# Patient Record
Sex: FEMALE | Race: WHITE | NOT HISPANIC OR LATINO | Employment: OTHER | URBAN - METROPOLITAN AREA
[De-identification: names, ages, dates, MRNs, and addresses within clinical notes are randomized per-mention and may not be internally consistent; named-entity substitution may affect disease eponyms.]

---

## 2023-08-18 ENCOUNTER — HOSPITAL ENCOUNTER (OUTPATIENT)
Dept: DATA CONVERSION | Facility: HOSPITAL | Age: 78
Discharge: HOME | End: 2023-08-18
Payer: MEDICARE

## 2023-08-18 DIAGNOSIS — E03.9 HYPOTHYROIDISM, UNSPECIFIED: ICD-10-CM

## 2023-08-18 LAB
T4 FREE SERPL-MCNC: 1.5 NG/DL (ref 0.9–1.7)
TSH SERPL DL<=0.05 MIU/L-ACNC: 0.31 MIU/L (ref 0.27–4.2)

## 2023-09-25 PROBLEM — R20.2 PARESTHESIA: Status: ACTIVE | Noted: 2023-09-25

## 2023-09-25 PROBLEM — I67.9 CEREBROVASCULAR DISEASE: Status: ACTIVE | Noted: 2023-09-25

## 2023-09-25 PROBLEM — N95.0 POST-MENOPAUSAL BLEEDING: Status: ACTIVE | Noted: 2023-09-25

## 2023-09-25 PROBLEM — K21.9 GASTROESOPHAGEAL REFLUX DISEASE: Status: ACTIVE | Noted: 2023-09-25

## 2023-09-25 PROBLEM — E78.5 HYPERLIPIDEMIA: Status: ACTIVE | Noted: 2023-09-25

## 2023-09-25 PROBLEM — R91.1 PULMONARY NODULE: Status: ACTIVE | Noted: 2023-09-25

## 2023-09-25 PROBLEM — H91.90 HEARING LOSS: Status: ACTIVE | Noted: 2023-09-25

## 2023-09-25 PROBLEM — H93.13 BILATERAL TINNITUS: Status: ACTIVE | Noted: 2023-09-25

## 2023-09-25 PROBLEM — S43.421A SPRAIN OF RIGHT ROTATOR CUFF CAPSULE: Status: ACTIVE | Noted: 2023-09-25

## 2023-09-25 PROBLEM — Z98.890 S/P ROTATOR CUFF REPAIR: Status: ACTIVE | Noted: 2023-09-25

## 2023-09-25 PROBLEM — R73.03 PREDIABETES: Status: ACTIVE | Noted: 2023-09-25

## 2023-09-25 PROBLEM — R42 DIZZINESS: Status: ACTIVE | Noted: 2023-09-25

## 2023-09-25 PROBLEM — R06.02 SHORTNESS OF BREATH: Status: ACTIVE | Noted: 2023-09-25

## 2023-09-25 PROBLEM — E03.9 HYPOTHYROID: Status: ACTIVE | Noted: 2023-09-25

## 2023-09-25 PROBLEM — D32.0 BENIGN NEOPLASM OF CEREBRAL MENINGES (MULTI): Status: ACTIVE | Noted: 2023-09-25

## 2023-09-25 PROBLEM — H90.3 SENSORINEURAL HEARING LOSS (SNHL) OF BOTH EARS: Status: ACTIVE | Noted: 2023-09-25

## 2023-09-25 PROBLEM — E04.9 GOITER: Status: ACTIVE | Noted: 2023-09-25

## 2023-09-25 PROBLEM — R06.2 WHEEZING: Status: ACTIVE | Noted: 2023-09-25

## 2023-09-25 PROBLEM — N95.1 MENOPAUSAL SYNDROME: Status: ACTIVE | Noted: 2023-09-25

## 2023-09-25 PROBLEM — N95.9 MENOPAUSAL PROBLEM: Status: ACTIVE | Noted: 2023-09-25

## 2023-09-25 PROBLEM — R30.0 DYSURIA: Status: ACTIVE | Noted: 2023-09-25

## 2023-09-25 PROBLEM — E55.9 VITAMIN D DEFICIENCY: Status: ACTIVE | Noted: 2023-09-25

## 2023-09-25 PROBLEM — D72.829 LEUKOCYTOSIS: Status: ACTIVE | Noted: 2023-09-25

## 2023-09-25 PROBLEM — J45.909 REACTIVE AIRWAY DISEASE (HHS-HCC): Status: ACTIVE | Noted: 2023-09-25

## 2023-09-25 PROBLEM — I10 HYPERTENSION: Status: ACTIVE | Noted: 2023-09-25

## 2023-09-25 PROBLEM — M75.121 COMPLETE TEAR OF RIGHT ROTATOR CUFF: Status: ACTIVE | Noted: 2023-09-25

## 2023-09-25 RX ORDER — GABAPENTIN 100 MG/1
1-2 CAPSULE ORAL 3 TIMES DAILY PRN
COMMUNITY
Start: 2018-02-20 | End: 2023-10-03 | Stop reason: ALTCHOICE

## 2023-09-25 RX ORDER — LEVOTHYROXINE SODIUM 125 UG/1
125 TABLET ORAL
COMMUNITY
End: 2023-10-03 | Stop reason: SDUPTHER

## 2023-09-25 RX ORDER — NAPROXEN SODIUM 220 MG/1
1 TABLET, FILM COATED ORAL DAILY
COMMUNITY
Start: 2022-06-27 | End: 2023-10-03 | Stop reason: ALTCHOICE

## 2023-09-25 RX ORDER — VIT C/E/ZN/COPPR/LUTEIN/ZEAXAN 250MG-90MG
1 CAPSULE ORAL DAILY
COMMUNITY
Start: 2018-05-10 | End: 2023-10-03 | Stop reason: ALTCHOICE

## 2023-09-25 RX ORDER — OMEPRAZOLE 40 MG/1
40 CAPSULE, DELAYED RELEASE ORAL 2 TIMES DAILY PRN
COMMUNITY

## 2023-09-25 RX ORDER — HYDROXYZINE HYDROCHLORIDE 25 MG/1
1 TABLET, FILM COATED ORAL DAILY
COMMUNITY
Start: 2016-07-02

## 2023-09-25 RX ORDER — CLOTRIMAZOLE AND BETAMETHASONE DIPROPIONATE 10; .64 MG/G; MG/G
1 CREAM TOPICAL 2 TIMES DAILY
COMMUNITY
Start: 2023-04-20

## 2023-09-25 RX ORDER — UBIDECARENONE 75 MG
500 CAPSULE ORAL DAILY
COMMUNITY
Start: 2023-03-07

## 2023-09-25 RX ORDER — METFORMIN HYDROCHLORIDE EXTENDED-RELEASE TABLETS 500 MG/1
1 TABLET, FILM COATED, EXTENDED RELEASE ORAL
COMMUNITY
Start: 2023-03-07 | End: 2023-10-03

## 2023-09-25 RX ORDER — CHOLECALCIFEROL (VITAMIN D3) 125 MCG
3 CAPSULE ORAL DAILY
COMMUNITY
Start: 2021-01-14

## 2023-09-25 RX ORDER — EZETIMIBE 10 MG/1
1 TABLET ORAL DAILY
COMMUNITY
End: 2023-10-03

## 2023-09-25 RX ORDER — FLUTICASONE PROPIONATE 50 MCG
2 SPRAY, SUSPENSION (ML) NASAL DAILY
COMMUNITY

## 2023-10-03 ENCOUNTER — OFFICE VISIT (OUTPATIENT)
Dept: PRIMARY CARE | Facility: CLINIC | Age: 78
End: 2023-10-03
Payer: MEDICARE

## 2023-10-03 VITALS
BODY MASS INDEX: 36.71 KG/M2 | OXYGEN SATURATION: 95 % | DIASTOLIC BLOOD PRESSURE: 80 MMHG | HEART RATE: 98 BPM | WEIGHT: 187 LBS | HEIGHT: 60 IN | SYSTOLIC BLOOD PRESSURE: 126 MMHG

## 2023-10-03 DIAGNOSIS — E06.3 HYPOTHYROIDISM DUE TO HASHIMOTO'S THYROIDITIS: Primary | ICD-10-CM

## 2023-10-03 DIAGNOSIS — L98.9 SKIN LESION OF HAND: ICD-10-CM

## 2023-10-03 DIAGNOSIS — L30.9 DERMATITIS: ICD-10-CM

## 2023-10-03 DIAGNOSIS — E03.8 HYPOTHYROIDISM DUE TO HASHIMOTO'S THYROIDITIS: Primary | ICD-10-CM

## 2023-10-03 DIAGNOSIS — L57.0 AK (ACTINIC KERATOSIS): ICD-10-CM

## 2023-10-03 PROCEDURE — 17110 DESTRUCTION B9 LES UP TO 14: CPT

## 2023-10-03 PROCEDURE — 99213 OFFICE O/P EST LOW 20 MIN: CPT

## 2023-10-03 PROCEDURE — 3079F DIAST BP 80-89 MM HG: CPT

## 2023-10-03 PROCEDURE — 3074F SYST BP LT 130 MM HG: CPT

## 2023-10-03 PROCEDURE — 1159F MED LIST DOCD IN RCRD: CPT

## 2023-10-03 PROCEDURE — 1126F AMNT PAIN NOTED NONE PRSNT: CPT

## 2023-10-03 PROCEDURE — 1036F TOBACCO NON-USER: CPT

## 2023-10-03 RX ORDER — LEVOTHYROXINE SODIUM 125 UG/1
125 TABLET ORAL
Qty: 90 TABLET | Refills: 3 | Status: SHIPPED | OUTPATIENT
Start: 2023-10-03 | End: 2023-11-20

## 2023-10-03 ASSESSMENT — ENCOUNTER SYMPTOMS
PALPITATIONS: 0
WEIGHT GAIN: 0
WEIGHT LOSS: 0
FATIGUE: 0
TREMORS: 0
FEVER: 0
HAIR LOSS: 0
CHILLS: 0

## 2023-10-03 ASSESSMENT — PAIN SCALES - GENERAL: PAINLEVEL: 0-NO PAIN

## 2023-10-03 NOTE — PATIENT INSTRUCTIONS
-Patient is already est with [facility]. She was instructed to call and let them know she has a new growing skin lesion, educated needs bx. If unable to get in at a time,y matter patient is to call back and will fax referral for [facility], but prefers to try other first.   -Patient states her hydroxyzine is controlling the itch on her arms right now, but if worsens is to call.

## 2023-10-03 NOTE — PROGRESS NOTES
Subjective   Patient ID: Erica Smith is a 78 y.o. female who presents for Mass (Pt concerned for a growth on lt hand progressively getting bigger and sore /la ).    Thyroid Problem  Presents for follow-up visit. Patient reports no cold intolerance, fatigue, hair loss, heat intolerance, palpitations, tremors, weight gain or weight loss. The symptoms have been stable (On 125 mcg of Synthroid. Needs refills. Last TSH 9/18/2023 was normal.).      Left hand growth x 3 months. Noticed after blood .  Progressively enlarging and becoming sore. Also woke up itchy both arms and spot itchy. Patient endorses atopic dermatitis and was on hydroxyzine 25 mg and Gabapentin. Denies medication changes.     Acquired hypothyroidism: on . Last TSH on 8/18/2023 was normal. Needs reills.     Review of Systems   Constitutional:  Negative for chills, fatigue, fever, weight gain and weight loss.   Cardiovascular:  Negative for palpitations.   Endocrine: Negative for cold intolerance and heat intolerance.   Skin:  Positive for rash.   Neurological:  Negative for tremors.       Objective   /80   Pulse 98   Ht 1.524 m (5')   Wt 84.8 kg (187 lb)   SpO2 95%   BMI 36.52 kg/m²     Physical Exam  Constitutional:       General: She is not in acute distress.     Appearance: Normal appearance.   Neck:      Thyroid: No thyroid mass or thyromegaly.   Cardiovascular:      Rate and Rhythm: Normal rate and regular rhythm.      Heart sounds: No murmur heard.  Pulmonary:      Breath sounds: Normal breath sounds. No wheezing, rhonchi or rales.   Skin:     General: Skin is warm and dry.      Findings: Erythema, lesion and rash present.             Comments: 1.5 raised white plaque lesion with surrounding erythema on left dorsal hand. Erythematous nonhealing papule less than 0.5 cm on posterior right forearm. Finally generalized erythematous rash with mild scale on left forearm.    Neurological:      Mental Status: She is alert.    Psychiatric:         Mood and Affect: Mood normal.         Behavior: Behavior normal.         Assessment/Plan   Patient ID: Erica Smith is a 78 y.o. female.    Destruction of lesion    Date/Time: 10/3/2023 11:37 AM    Performed by: Angie Harris PA-C  Authorized by: Angie Harris PA-C    Number of Lesions: 1  Lesion 1:     Body area: upper extremity    Upper extremity location: R upper arm    Malignancy: benign lesion      Destruction method: cryotherapy    Diagnoses and all orders for this visit:  Hypothyroidism due to Hashimoto's thyroiditis  Dermatitis  AK (actinic keratosis)  Skin lesion of hand  Other orders  -     Destruction of lesion  -Patient is already est with [facility]. She was instructed to call and let them know she has a new growing skin lesion, educated needs bx. If unable to get in at a time,y matter patient is to call back and will fax referral for [facility], but prefers to try other first.   -Patient states her hydroxyzine is controlling the itch on her arms right now, but if worsens is to call.

## 2023-11-17 ENCOUNTER — OFFICE VISIT (OUTPATIENT)
Dept: PRIMARY CARE | Facility: CLINIC | Age: 78
End: 2023-11-17
Payer: MEDICARE

## 2023-11-17 VITALS
HEART RATE: 74 BPM | SYSTOLIC BLOOD PRESSURE: 124 MMHG | WEIGHT: 184 LBS | DIASTOLIC BLOOD PRESSURE: 80 MMHG | BODY MASS INDEX: 35.94 KG/M2 | OXYGEN SATURATION: 98 %

## 2023-11-17 DIAGNOSIS — M70.62 GREATER TROCHANTERIC BURSITIS OF BOTH HIPS: ICD-10-CM

## 2023-11-17 DIAGNOSIS — G56.01 RIGHT CARPAL TUNNEL SYNDROME: Primary | ICD-10-CM

## 2023-11-17 DIAGNOSIS — M76.31 IT BAND SYNDROME, RIGHT: ICD-10-CM

## 2023-11-17 DIAGNOSIS — M70.61 GREATER TROCHANTERIC BURSITIS OF BOTH HIPS: ICD-10-CM

## 2023-11-17 PROCEDURE — 1125F AMNT PAIN NOTED PAIN PRSNT: CPT

## 2023-11-17 PROCEDURE — 3074F SYST BP LT 130 MM HG: CPT

## 2023-11-17 PROCEDURE — 99213 OFFICE O/P EST LOW 20 MIN: CPT

## 2023-11-17 PROCEDURE — 1036F TOBACCO NON-USER: CPT

## 2023-11-17 PROCEDURE — 1159F MED LIST DOCD IN RCRD: CPT

## 2023-11-17 PROCEDURE — 3079F DIAST BP 80-89 MM HG: CPT

## 2023-11-17 RX ORDER — LEVOTHYROXINE SODIUM 125 UG/1
125 TABLET ORAL
COMMUNITY
End: 2023-11-17 | Stop reason: WASHOUT

## 2023-11-17 RX ORDER — PREDNISONE 10 MG/1
TABLET ORAL
Qty: 20 TABLET | Refills: 0 | Status: SHIPPED | OUTPATIENT
Start: 2023-11-17 | End: 2023-11-25

## 2023-11-17 RX ORDER — DUPILUMAB 300 MG/2ML
INJECTION, SOLUTION SUBCUTANEOUS
COMMUNITY

## 2023-11-17 RX ORDER — MECLIZINE HYDROCHLORIDE 25 MG/1
25 TABLET ORAL 3 TIMES DAILY PRN
COMMUNITY
Start: 2023-03-22

## 2023-11-17 ASSESSMENT — COLUMBIA-SUICIDE SEVERITY RATING SCALE - C-SSRS
1. IN THE PAST MONTH, HAVE YOU WISHED YOU WERE DEAD OR WISHED YOU COULD GO TO SLEEP AND NOT WAKE UP?: NO
6. HAVE YOU EVER DONE ANYTHING, STARTED TO DO ANYTHING, OR PREPARED TO DO ANYTHING TO END YOUR LIFE?: NO
2. HAVE YOU ACTUALLY HAD ANY THOUGHTS OF KILLING YOURSELF?: NO

## 2023-11-17 ASSESSMENT — ENCOUNTER SYMPTOMS
WEAKNESS: 1
BACK PAIN: 1
NECK STIFFNESS: 1
MYALGIAS: 1
NUMBNESS: 1

## 2023-11-17 ASSESSMENT — PATIENT HEALTH QUESTIONNAIRE - PHQ9
2. FEELING DOWN, DEPRESSED OR HOPELESS: NOT AT ALL
SUM OF ALL RESPONSES TO PHQ9 QUESTIONS 1 AND 2: 0
1. LITTLE INTEREST OR PLEASURE IN DOING THINGS: NOT AT ALL

## 2023-11-17 ASSESSMENT — PAIN SCALES - GENERAL: PAINLEVEL: 2

## 2023-11-17 NOTE — PROGRESS NOTES
"Subjective   Patient ID: Erica Smith is a 78 y.o. female who presents for Hand Pain (Pt is here to discuss the numbness in her right hand/ nr//Pt also wanted to discuss the burning pain, on her right thigh ).    Numbness in right hand x months. Associated hand weakness. Patient states on/off and is worsening. Started with right thumb and moved into the 2nd and 3rd digit now as well. Associated with weakness. Patient had carpal tunnel in left hand and had hand surgery 10/15/2018. States feels the same and would like referral to hand ortho. Previously went to [facility].     Right thigh pain x 2-3 weeks. No radiation of pain. Patient recently started part time job. Exacerbated when standing at work. Patient states pain is quick, and described as \"burn.\" When occurs if moves more/changes position pain goes away. Mild back pain, which is chronic and feels not associated with this. Also endorses more sensitive to touch.          Review of Systems   Musculoskeletal:  Positive for back pain, myalgias and neck stiffness.   Neurological:  Positive for weakness and numbness.       Objective   /80   Pulse 74   Wt 83.5 kg (184 lb)   SpO2 98%   BMI 35.94 kg/m²     Physical Exam  Constitutional:       Appearance: Normal appearance.   Cardiovascular:      Rate and Rhythm: Normal rate.   Pulmonary:      Effort: Pulmonary effort is normal.   Musculoskeletal:      Right wrist: No deformity or tenderness. Normal range of motion.      Left wrist: No deformity or tenderness. Normal range of motion.      Right hand: No deformity or tenderness. Normal strength.      Left hand: No deformity or tenderness. Normal strength.      Right hip: Tenderness present. Decreased range of motion.      Left hip: Tenderness present. Decreased range of motion.      Right upper leg: Tenderness present.      Comments: Unable to perform Phalen's test due to arm/shoulder mobility, negative right wrist Tinel Test.     TTP of lateral right " thigh and bilateral greater trochanter.    Neurological:      Mental Status: She is alert.   Psychiatric:         Mood and Affect: Mood and affect normal.         Assessment/Plan   Diagnoses and all orders for this visit:  Right carpal tunnel syndrome  -     Referral to Orthopaedic Surgery; Future  It band syndrome, right  -     Referral to Physical Therapy; Future  -     predniSONE (Deltasone) 10 mg tablet; Take 4 tablets (40 mg) by mouth once daily for 2 days, THEN 3 tablets (30 mg) once daily for 2 days, THEN 2 tablets (20 mg) once daily for 2 days, THEN 1 tablet (10 mg) once daily for 2 days.  Greater trochanteric bursitis of both hips  -     Referral to Physical Therapy; Future  -     predniSONE (Deltasone) 10 mg tablet; Take 4 tablets (40 mg) by mouth once daily for 2 days, THEN 3 tablets (30 mg) once daily for 2 days, THEN 2 tablets (20 mg) once daily for 2 days, THEN 1 tablet (10 mg) once daily for 2 days.  -Discuss possibility of bursa injections in future if needed.

## 2023-11-20 DIAGNOSIS — E06.3 HYPOTHYROIDISM DUE TO HASHIMOTO'S THYROIDITIS: ICD-10-CM

## 2023-11-20 DIAGNOSIS — E03.8 HYPOTHYROIDISM DUE TO HASHIMOTO'S THYROIDITIS: ICD-10-CM

## 2023-11-20 RX ORDER — LEVOTHYROXINE SODIUM 125 UG/1
TABLET ORAL
Qty: 90 TABLET | Refills: 1 | Status: SHIPPED | OUTPATIENT
Start: 2023-11-20 | End: 2024-05-02 | Stop reason: WASHOUT

## 2023-12-08 NOTE — PROGRESS NOTES
"Physical Therapy Evaluation and Treatment     Patient Name: Erica Smith  MRN: [id number]  PT Received On: 23  Time Calculation  Start Time: 1140  Stop Time: 1230  Time Calculation (min): 50 min  PT Evaluation Time Entry  PT Evaluation (Moderate) Time Entry: 40    Current Problem:   1. Chronic midline low back pain, unspecified whether sciatica present        2. It band syndrome, right  Referral to Physical Therapy      3. Greater trochanteric bursitis of both hips  Referral to Physical Therapy        Precautions:       Subjective Evaluation    History of Present Illness  Mechanism of injury: 77 yo female presents with chronic low back pain that has recently worsened. Pt was having RLE burning symptoms that has ceased after prednisone pack. Pt main complaint is significant low back pain with prolonged walking (>5 min). Relief with low back stretches. Vacuum bothers back. Notes very sedentary; wants to go to fitogram at [facility]. Pt describes feeling very stiff and states \"muscle that won't loosen up\". Hx of sciatica.    R RTC surgery; limited ROM     R hand numbness; going for EMG.     Recent picked up part-time at [employer]: standing does not bother her. Bothers neck looking down. 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     Pain  Current pain ratin  At best pain ratin  At worst pain rating: 10  Location: low back pain  Quality: tight  Relieving factors: change in position, rest, heat and medications  Progression: worsening    Social Support  Lives in: multiple-level home  Lives with: adult children and young children (Son, DIL, and two grandsons ())    Patient Goals  Patient goals for therapy: return to sport/leisure activities (loosen up)  Treatment goals comments: Pt wants to be able to go to zoo without having issues.      Objective     Postural Observations    Additional Postural Observation Details  Increased lumbar lordosis with forward head    Neurological Testing "     Sensation     Lumbar   Left   Intact: light touch    Right   Intact: light touch    Comments   Left light touch: min sensitive  Right light touch: min sensitive    Palpation   Left   Hypertonic in the erector spinae and lumbar paraspinals.   Tenderness of the erector spinae and lumbar paraspinals.     Right   Hypertonic in the erector spinae. Tenderness of the erector spinae and lumbar paraspinals.     Additional Palpation Details  TTP to B glute complex, piriformis and thoracolumbar fascia    Active Range of Motion     Lumbar   Flexion: Active lumbar flexion: 50% with pain  Extension: Active lumbar extension: 75% imp. with pain  Left lateral flexion: Active left lumbar lateral flexion: 25% imp. with pain  Right lateral flexion: Active right lumbar lateral flexion: 25% imp. with pain  Left rotation: with pain  Right rotation: with pain    Additional Active Range of Motion Details  Significantly limited in all lumbar ROM with c/o tightness    Strength/Myotome Testing     Left Hip   Planes of Motion   Flexion: 3  Extension: 3  Abduction: 3  Adduction: 3    Right Hip   Planes of Motion   Flexion: 3  Extension: 3  Abduction: 3  Adduction: 3    Left Knee   Flexion: 3+  Extension: 3+    Right Knee   Flexion: 3+  Extension: 3+    Tests       Thoracic   Negative slump.     Lumbar     Left   Negative passive SLR.     Right   Negative passive SLR.         Other Measures  Oswestry Disablity Index (LORRAINE): 15    Treatments:  Therapeutic Exercise:   Reviewed and completed HEP         Assessment & Plan     Assessment  Impairments: abnormal muscle tone, abnormal or restricted ROM, activity intolerance, impaired balance, impaired physical strength, lacks appropriate home exercise program and pain with function  Assessment details: 78 year old patient presenting with exacerbation of chronic low back pain. Pt displays core/LE weakness, impaired balance, ROM deficits, poor flexibility, impaired posture, pain, gait deficits, and  decreased functional mobility. Moderate complexity due to patient's clinical presentation being evolving with changing characteristics, with comorbidities to include hypothyroidism, sedentary lifestyle, chronicity of pain, hx of R RTC with limited mobility, and current workout of nerve issue for R hand, all of which may negatively impact rehab tolerance and progression. Pt to benefit from skilled PT to address above impairments.   Prognosis: good    Goals  Treatment goals comments: Pt wants to be able to go to zoo without having issues.    Plan  Therapy options: will be seen for skilled physical therapy services  Planned modality interventions: TENS, ultrasound, electrical stimulation/Russian stimulation, thermotherapy (hydrocollator packs), cryotherapy, low level laser therapy and traction  Other planned modality interventions: Dry Needling  Planned therapy interventions: abdominal trunk stabilization, balance/weight-bearing training, body mechanics training, flexibility, functional ROM exercises, gait training, home exercise program, joint mobilization, manual therapy, neuromuscular re-education, soft tissue mobilization, spinal/joint mobilization, strengthening, stretching, therapeutic activities and transfer training  Treatment plan discussed with: patient  Plan details: EVAL ONLY - [facility] - Auth thru [facility] - $35 copay until OOP is met. OOP $3800 - $380 met. // Specialty Services Required      Post-Treatment Pain:   No change    Goals:   - Patient will demonstrate independence and report compliance with HEP to facilitate independent rehab program upon discharge.  - Patient will report decrease in pain by > 2 points to meet MCID  - Pt will increase muscle strength by >/= 1/2 MMT to provide improved stability and ability to perform activities such as getting up off the ground after dusting/cutting dogs nails, resume full work duties without pain, able to perform ADLs/IADLs without pain.  - Pt will display  pain-free lumbar and hip AROM WFL for less difficulty squatting, transferring, and negotiating stairs.  - Patient will report ability to ambulate for >30 min without need to stop to allow her to complete grocery shopping and recreational activities.   - Patient will demo decrease of LORRAINE by 10% points to demo improved function and meet MCID    HEP:   Exercises: Access Code: [email or web address]  - Supine Posterior Pelvic Tilt  - 1 x daily - 7 x weekly - 2 sets - 10 reps  - Supine Lower Trunk Rotation  - 1 x daily - 7 x weekly - 2 sets - 10 reps  - Hooklying Single Knee to Chest Stretch  - 1 x daily - 7 x weekly - 1 sets - 10 reps - 15s hold  - Hooklying Active Hamstring Stretch  - 1 x daily - 7 x weekly - 2 sets - 10 reps - 5s hold  - Supine Double Knee to Chest  - 1 x daily - 7 x weekly - 1 sets - 10 reps - 15s hold  - Supine Figure 4 Piriformis Stretch  - 1 x daily - 7 x weekly - 1 sets - 5 reps - 15s hold  - Sidelying Open Book Thoracic Lumbar Rotation and Extension  - 1 x daily - 7 x weekly - 2 sets - 10 reps  - Hooklying Gluteal Sets  - 1 x daily - 7 x weekly - 2 sets - 10 reps - 5s hold

## 2023-12-11 ENCOUNTER — EVALUATION (OUTPATIENT)
Dept: PHYSICAL THERAPY | Facility: CLINIC | Age: 78
End: 2023-12-11
Payer: MEDICARE

## 2023-12-11 DIAGNOSIS — M70.61 GREATER TROCHANTERIC BURSITIS OF BOTH HIPS: ICD-10-CM

## 2023-12-11 DIAGNOSIS — M76.31 IT BAND SYNDROME, RIGHT: ICD-10-CM

## 2023-12-11 DIAGNOSIS — M70.62 GREATER TROCHANTERIC BURSITIS OF BOTH HIPS: ICD-10-CM

## 2023-12-11 DIAGNOSIS — M54.50 CHRONIC MIDLINE LOW BACK PAIN, UNSPECIFIED WHETHER SCIATICA PRESENT: Primary | ICD-10-CM

## 2023-12-11 DIAGNOSIS — G89.29 CHRONIC MIDLINE LOW BACK PAIN, UNSPECIFIED WHETHER SCIATICA PRESENT: Primary | ICD-10-CM

## 2023-12-11 PROCEDURE — 97162 PT EVAL MOD COMPLEX 30 MIN: CPT | Mod: GP

## 2023-12-11 ASSESSMENT — ENCOUNTER SYMPTOMS
PAIN SCALE AT LOWEST: 0
PAIN SCALE AT HIGHEST: 10
QUALITY: TIGHT
PAIN SCALE: 5
ALLEVIATING FACTORS: CHANGE IN POSITION
ALLEVIATING FACTORS: REST
PAIN LOCATION: LOW BACK PAIN
ALLEVIATING FACTORS: MEDICATIONS
ALLEVIATING FACTORS: HEAT

## 2023-12-11 ASSESSMENT — ACTIVITIES OF DAILY LIVING (ADL): POOR_BALANCE: 1

## 2024-01-10 ENCOUNTER — TREATMENT (OUTPATIENT)
Dept: PHYSICAL THERAPY | Facility: CLINIC | Age: 79
End: 2024-01-10
Payer: COMMERCIAL

## 2024-01-10 DIAGNOSIS — M70.61 GREATER TROCHANTERIC BURSITIS OF BOTH HIPS: ICD-10-CM

## 2024-01-10 DIAGNOSIS — M70.62 GREATER TROCHANTERIC BURSITIS OF BOTH HIPS: ICD-10-CM

## 2024-01-10 DIAGNOSIS — M54.50 CHRONIC MIDLINE LOW BACK PAIN, UNSPECIFIED WHETHER SCIATICA PRESENT: ICD-10-CM

## 2024-01-10 DIAGNOSIS — M76.31 IT BAND SYNDROME, RIGHT: ICD-10-CM

## 2024-01-10 DIAGNOSIS — G89.29 CHRONIC MIDLINE LOW BACK PAIN, UNSPECIFIED WHETHER SCIATICA PRESENT: ICD-10-CM

## 2024-01-10 PROCEDURE — 97110 THERAPEUTIC EXERCISES: CPT | Mod: GP

## 2024-01-10 ASSESSMENT — PAIN SCALES - GENERAL: PAINLEVEL_OUTOF10: 5 - MODERATE PAIN

## 2024-01-10 ASSESSMENT — PAIN - FUNCTIONAL ASSESSMENT: PAIN_FUNCTIONAL_ASSESSMENT: 0-10

## 2024-01-10 NOTE — PROGRESS NOTES
Physical Therapy Treatment    Patient Name: Erica Smith  MRN: [id number]  PT Received On: 01/10/24  Time Calculation  Start Time: 0130  Stop Time: 0215  Time Calculation (min): 45 min  PT Therapeutic Procedures Time Entry  Therapeutic Exercise Time Entry: 40  Visit Number: 1  Visits/Dates Authorized: 6    Current Problem:   1. It band syndrome, right  Follow Up In Physical Therapy      2. Greater trochanteric bursitis of both hips  Follow Up In Physical Therapy      3. Chronic midline low back pain, unspecified whether sciatica present  Follow Up In Physical Therapy        Precautions:        Subjective   General:   Pt has been completing HEP intermittently. No increase in pain but notes not significantly better. Pt has not been to therapy since evaluation due to expected insurance change come the new year and lack of available appointments to match her schedule.         Pre-Treatment Symptoms:   Pain Assessment: 0-10  Pain Score: 5 - Moderate pain    Objective   Findings:     Treatments:  Therapeutic Exercise:   Reviewed and completed HEP  Bridge 2x10   Hip add/abd 2x10       Neuromuscular Re-Ed:     Therapeutic Activity:    Gait Training:     Manual Therapy:     Modalities:         Assessment:   Pt tolerated tx without adverse response. Educated pt on purpose of exercises and reviewed how to complete correctly. Progress as able.      Post-Treatment Symptoms:   Response to Interventions: No worse    Plan:    Continue with lumbar mobility and strengthening    Goals:   Active       LBP       LTG's       Start:  12/11/23    Expected End:  03/10/24       ?- Patient will demonstrate independence and report compliance with HEP to facilitate independent rehab program upon discharge.  - Patient will report decrease in pain by > 2 points to meet MCID  - Pt will increase muscle strength by >/= 1/2 MMT to provide improved stability and ability to perform activities such as getting up off the ground after dusting/cutting  dogs nails, resume full work duties without pain, able to perform ADLs/IADLs without pain.  - Pt will display pain-free lumbar and hip AROM WFL for less difficulty squatting, transferring, and negotiating stairs.  - Patient will report ability to ambulate for >30 min without need to stop to allow her to complete grocery shopping and recreational activities.   - Patient will demo decrease of LORRAINE by 10% points to demo improved function and meet MCID           HEP:  Exercises - Access Code: [email or web address]  - Supine Posterior Pelvic Tilt  - 1 x daily - 7 x weekly - 2 sets - 10 reps  - Supine Lower Trunk Rotation  - 1 x daily - 7 x weekly - 2 sets - 10 reps  - Hooklying Single Knee to Chest Stretch  - 1 x daily - 7 x weekly - 1 sets - 10 reps - 15s hold  - Hooklying Active Hamstring Stretch  - 1 x daily - 7 x weekly - 2 sets - 10 reps - 5s hold  - Supine Double Knee to Chest  - 1 x daily - 7 x weekly - 1 sets - 10 reps - 15s hold  - Supine Figure 4 Piriformis Stretch  - 1 x daily - 7 x weekly - 1 sets - 5 reps - 15s hold  - Sidelying Open Book Thoracic Lumbar Rotation and Extension  - 1 x daily - 7 x weekly - 2 sets - 10 reps  - Hooklying Gluteal Sets  - 1 x daily - 7 x weekly - 2 sets - 10 reps - 5s hold

## 2024-03-06 ENCOUNTER — TRANSCRIBE ORDERS (OUTPATIENT)
Dept: ORTHOPEDIC SURGERY | Facility: HOSPITAL | Age: 79
End: 2024-03-06
Payer: COMMERCIAL

## 2024-03-06 DIAGNOSIS — M54.2 CERVICAL PAIN (NECK): ICD-10-CM

## 2024-03-08 ENCOUNTER — OFFICE VISIT (OUTPATIENT)
Dept: ORTHOPEDIC SURGERY | Facility: CLINIC | Age: 79
End: 2024-03-08
Payer: COMMERCIAL

## 2024-03-08 ENCOUNTER — HOSPITAL ENCOUNTER (OUTPATIENT)
Dept: RADIOLOGY | Facility: CLINIC | Age: 79
Discharge: HOME | End: 2024-03-08
Payer: COMMERCIAL

## 2024-03-08 VITALS — HEIGHT: 60 IN | WEIGHT: 186 LBS | BODY MASS INDEX: 36.52 KG/M2

## 2024-03-08 DIAGNOSIS — M54.12 CERVICAL RADICULOPATHY AT C5: Primary | ICD-10-CM

## 2024-03-08 DIAGNOSIS — M54.2 CERVICAL PAIN (NECK): ICD-10-CM

## 2024-03-08 PROCEDURE — 1159F MED LIST DOCD IN RCRD: CPT | Performed by: ORTHOPAEDIC SURGERY

## 2024-03-08 PROCEDURE — 72050 X-RAY EXAM NECK SPINE 4/5VWS: CPT

## 2024-03-08 PROCEDURE — 99203 OFFICE O/P NEW LOW 30 MIN: CPT | Performed by: ORTHOPAEDIC SURGERY

## 2024-03-08 PROCEDURE — 1125F AMNT PAIN NOTED PAIN PRSNT: CPT | Performed by: ORTHOPAEDIC SURGERY

## 2024-03-08 PROCEDURE — 72050 X-RAY EXAM NECK SPINE 4/5VWS: CPT | Performed by: RADIOLOGY

## 2024-03-08 PROCEDURE — 1036F TOBACCO NON-USER: CPT | Performed by: ORTHOPAEDIC SURGERY

## 2024-03-08 ASSESSMENT — PAIN - FUNCTIONAL ASSESSMENT: PAIN_FUNCTIONAL_ASSESSMENT: NO/DENIES PAIN

## 2024-03-08 NOTE — LETTER
March 11, 2024     Carmelo Jensen MD  [address] [address]  [place] [address]    Patient: Erica Smith   YOB: 1945   Date of Visit: 3/8/2024       Dear Dr. Carmelo Jensen MD:    Thank you for referring Erica Smith to me for evaluation. Below are my notes for this consultation.  If you have questions, please do not hesitate to call me. I look forward to following your patient along with you.       Sincerely,     Pete Cain MD      CC: No Recipients  ______________________________________________________________________________________    HPI:Erica Smith is a pleasant 79-year-old woman, who comes in today with complaints of some intermittent tingling in her right upper extremity.  It is largely in the shoulder and upper arm region.  She has had surgery on her right shoulder.  Patient states that she is unable to fully abduct that shoulder.  She has no pain in the arm.  She denies any myelopathic symptoms.  The numbness and tingling is completely tolerable at this point.      ROS:  Reviewed on EMR and patient intake sheet.    PMH/SH:   Reviewed on EMR and patient intake sheet.    Exam:  Physical Exam    Constitutional: Well appearing; no acute distress  Eyes: pupils are equal and round  Psych: normal affect  Respiratory: non-labored breathing  Cardiovascular: regular rate and rhythm  GI: non-distended abdomen  Musculoskeletal: Patient is able to abduct the right shoulder to approximately 90 degrees.  She is able to provide some resistance with mild pain.    Neurologic: [4+]/5 strength in the upper extremities bilaterally with the exception of 3/5 in right deltoid]; [negative] Yanez's; [no hyper-reflexia]    Radiology:  EMG is consistent with a chronic C5 radiculopathy    Diagnosis:  C5 radiculopathy    Assessment and Plan:   79-year-old woman with tingling in the right upper extremity and some limited range of motion.  EMG is consistent with a chronic C5 radiculopathy.  At this  time the patient states that her symptoms are quite tolerable and she has learned to accommodate them.  She is not interested in any further surgical intervention.  I believe her decreased range of motion is probably multifactorial including postsurgical limitation from her recent shoulder surgery as well as a concomitant C5 radiculopathy.  However, in the absence of pain and any progressive weakness, the patient is not interested in any further surgical intervention.  She will follow-up with me as needed should these stipulations change.    The patient was in agreement with the plan. At the end of the visit today, the patient felt that all questions had been answered satisfactorily.  The patient was pleased with the visit and very appreciative for the care rendered.     Thank you very much for the kind referral.  It is a privilege, and a pleasure, to partner with you in the care of your patients.  I would be delighted to assist you with any further consultations as needed.      Pete Cain MD    Chief of Spine Surgery, [facility]  Director of Spine Service, [facility]  , Department of Orthopaedics  [facility] [employer]  [address] [place], [place] [address] [id number]  P: [phone]  [facility]    This note was dictated with voice recognition software.  It has not been proofread for grammatical errors, typographical mistakes or other semantic inconsistencies.

## 2024-03-11 NOTE — PROGRESS NOTES
HPI:Erica Smith is a pleasant 79-year-old woman, who comes in today with complaints of some intermittent tingling in her right upper extremity.  It is largely in the shoulder and upper arm region.  She has had surgery on her right shoulder.  Patient states that she is unable to fully abduct that shoulder.  She has no pain in the arm.  She denies any myelopathic symptoms.  The numbness and tingling is completely tolerable at this point.      ROS:  Reviewed on EMR and patient intake sheet.    PMH/SH:   Reviewed on EMR and patient intake sheet.    Exam:  Physical Exam    Constitutional: Well appearing; no acute distress  Eyes: pupils are equal and round  Psych: normal affect  Respiratory: non-labored breathing  Cardiovascular: regular rate and rhythm  GI: non-distended abdomen  Musculoskeletal: Patient is able to abduct the right shoulder to approximately 90 degrees.  She is able to provide some resistance with mild pain.    Neurologic: [4+]/5 strength in the upper extremities bilaterally with the exception of 3/5 in right deltoid]; [negative] Yanez's; [no hyper-reflexia]    Radiology:  EMG is consistent with a chronic C5 radiculopathy    Diagnosis:  C5 radiculopathy    Assessment and Plan:   79-year-old woman with tingling in the right upper extremity and some limited range of motion.  EMG is consistent with a chronic C5 radiculopathy.  At this time the patient states that her symptoms are quite tolerable and she has learned to accommodate them.  She is not interested in any further surgical intervention.  I believe her decreased range of motion is probably multifactorial including postsurgical limitation from her recent shoulder surgery as well as a concomitant C5 radiculopathy.  However, in the absence of pain and any progressive weakness, the patient is not interested in any further surgical intervention.  She will follow-up with me as needed should these stipulations change.    The patient was in agreement  with the plan. At the end of the visit today, the patient felt that all questions had been answered satisfactorily.  The patient was pleased with the visit and very appreciative for the care rendered.     Thank you very much for the kind referral.  It is a privilege, and a pleasure, to partner with you in the care of your patients.  I would be delighted to assist you with any further consultations as needed.      Pete Cain MD    Chief of Spine Surgery, [facility]  Director of Spine Service, [facility]  , Department of Orthopaedics  [facility]  [address] [id number]  P: [phone]  [email or web address]    This note was dictated with voice recognition software.  It has not been proofread for grammatical errors, typographical mistakes or other semantic inconsistencies.

## 2024-03-15 ENCOUNTER — LAB REQUISITION (OUTPATIENT)
Dept: LAB | Facility: HOSPITAL | Age: 79
End: 2024-03-15
Payer: COMMERCIAL

## 2024-03-15 DIAGNOSIS — N39.0 URINARY TRACT INFECTION, SITE NOT SPECIFIED: ICD-10-CM

## 2024-03-15 PROCEDURE — 87186 SC STD MICRODIL/AGAR DIL: CPT

## 2024-03-15 PROCEDURE — 87086 URINE CULTURE/COLONY COUNT: CPT

## 2024-03-19 LAB — BACTERIA UR CULT: ABNORMAL

## 2024-04-03 ENCOUNTER — DOCUMENTATION (OUTPATIENT)
Dept: PHYSICAL THERAPY | Facility: CLINIC | Age: 79
End: 2024-04-03
Payer: COMMERCIAL

## 2024-04-03 NOTE — PROGRESS NOTES
Physical Therapy    Discharge Summary    Name: Erica Smith  MRN: [id number]  : 1945  Date: 24    Discharge Summary: PT    Discharge Information: Date of discharge 4/3/24, Date of last visit 1/10/24, Date of evaluation 23, and Number of attended visits 2    Therapy Summary: Pt attended initial evaluation. Pt returned for one visit having been completing HEP intermittently. No increase in pain but notes not significantly better. Pt has not been to therapy since evaluation due to expected insurance change come the new year and lack of available appointments to match her schedule.  Pt did not return. Authorization ended.     Discharge Status: Resolved     Rehab Discharge Reason: Failed to schedule and/or keep follow-up appointment(s)

## 2024-05-01 ENCOUNTER — OFFICE VISIT (OUTPATIENT)
Dept: PRIMARY CARE | Facility: CLINIC | Age: 79
End: 2024-05-01
Payer: COMMERCIAL

## 2024-05-01 VITALS
BODY MASS INDEX: 36.52 KG/M2 | HEIGHT: 60 IN | DIASTOLIC BLOOD PRESSURE: 80 MMHG | SYSTOLIC BLOOD PRESSURE: 132 MMHG | WEIGHT: 186 LBS | OXYGEN SATURATION: 98 % | HEART RATE: 73 BPM

## 2024-05-01 DIAGNOSIS — R53.83 FATIGUE, UNSPECIFIED TYPE: ICD-10-CM

## 2024-05-01 DIAGNOSIS — D32.0 BENIGN NEOPLASM OF CEREBRAL MENINGES (MULTI): ICD-10-CM

## 2024-05-01 DIAGNOSIS — E03.9 ACQUIRED HYPOTHYROIDISM: ICD-10-CM

## 2024-05-01 DIAGNOSIS — Z00.00 MEDICARE ANNUAL WELLNESS VISIT, SUBSEQUENT: Primary | ICD-10-CM

## 2024-05-01 DIAGNOSIS — R35.1 NOCTURIA: ICD-10-CM

## 2024-05-01 DIAGNOSIS — Z12.11 COLON CANCER SCREENING: ICD-10-CM

## 2024-05-01 DIAGNOSIS — N18.31 STAGE 3A CHRONIC KIDNEY DISEASE (MULTI): ICD-10-CM

## 2024-05-01 DIAGNOSIS — E66.01 OBESITY, MORBID (MULTI): ICD-10-CM

## 2024-05-01 DIAGNOSIS — Z12.11 ENCOUNTER FOR SCREENING FOR MALIGNANT NEOPLASM OF COLON: ICD-10-CM

## 2024-05-01 DIAGNOSIS — Z12.31 SCREENING MAMMOGRAM FOR BREAST CANCER: ICD-10-CM

## 2024-05-01 DIAGNOSIS — E55.9 VITAMIN D DEFICIENCY: ICD-10-CM

## 2024-05-01 DIAGNOSIS — Z23 ENCOUNTER FOR IMMUNIZATION: ICD-10-CM

## 2024-05-01 DIAGNOSIS — E78.2 MIXED HYPERLIPIDEMIA: ICD-10-CM

## 2024-05-01 PROCEDURE — 1126F AMNT PAIN NOTED NONE PRSNT: CPT | Performed by: FAMILY MEDICINE

## 2024-05-01 PROCEDURE — 99214 OFFICE O/P EST MOD 30 MIN: CPT | Performed by: FAMILY MEDICINE

## 2024-05-01 PROCEDURE — G0439 PPPS, SUBSEQ VISIT: HCPCS | Performed by: FAMILY MEDICINE

## 2024-05-01 PROCEDURE — 1158F ADVNC CARE PLAN TLK DOCD: CPT | Performed by: FAMILY MEDICINE

## 2024-05-01 PROCEDURE — 3075F SYST BP GE 130 - 139MM HG: CPT | Performed by: FAMILY MEDICINE

## 2024-05-01 PROCEDURE — 1159F MED LIST DOCD IN RCRD: CPT | Performed by: FAMILY MEDICINE

## 2024-05-01 PROCEDURE — 1160F RVW MEDS BY RX/DR IN RCRD: CPT | Performed by: FAMILY MEDICINE

## 2024-05-01 PROCEDURE — 1170F FXNL STATUS ASSESSED: CPT | Performed by: FAMILY MEDICINE

## 2024-05-01 PROCEDURE — 99215 OFFICE O/P EST HI 40 MIN: CPT | Performed by: FAMILY MEDICINE

## 2024-05-01 PROCEDURE — 1036F TOBACCO NON-USER: CPT | Performed by: FAMILY MEDICINE

## 2024-05-01 PROCEDURE — 1123F ACP DISCUSS/DSCN MKR DOCD: CPT | Performed by: FAMILY MEDICINE

## 2024-05-01 PROCEDURE — 3079F DIAST BP 80-89 MM HG: CPT | Performed by: FAMILY MEDICINE

## 2024-05-01 PROCEDURE — 90677 PCV20 VACCINE IM: CPT | Performed by: FAMILY MEDICINE

## 2024-05-01 RX ORDER — LATANOPROST 50 UG/ML
1 SOLUTION/ DROPS OPHTHALMIC NIGHTLY
COMMUNITY
Start: 2024-04-16

## 2024-05-01 ASSESSMENT — ENCOUNTER SYMPTOMS
TREMORS: 0
COUGH: 0
NAUSEA: 0
HEMATURIA: 0
OCCASIONAL FEELINGS OF UNSTEADINESS: 0
CONFUSION: 0
NERVOUS/ANXIOUS: 0
FEVER: 0
DIZZINESS: 0
SHORTNESS OF BREATH: 0
MYALGIAS: 1
WEAKNESS: 0
DEPRESSION: 0
FREQUENCY: 0
ARTHRALGIAS: 1
CONSTIPATION: 0
FATIGUE: 1
LOSS OF SENSATION IN FEET: 0
WHEEZING: 0
PALPITATIONS: 0
VOMITING: 0
CHILLS: 0

## 2024-05-01 ASSESSMENT — GERIATRIC MINI NUTRITIONAL ASSESSMENT (MNA)
C GENERAL MOBILITY: GOES OUT
B WEIGHT LOSS DURING THE LAST 3 MONTHS: DOES NOT KNOW
A HAS FOOD INTAKE DECLINED OVER THE PAST 3 MONTHS DUE TO LOSS OF APPETITE, DIGESTIVE PROBLEMS, CHEWING OR SWALLOWING DIFFICULTIES?: NO DECREASE IN FOOD INTAKE

## 2024-05-01 ASSESSMENT — ACTIVITIES OF DAILY LIVING (ADL)
FEEDING: INDEPENDENT
PILL BOX USED: YES
USING TELEPHONE: INDEPENDENT
EATING: INDEPENDENT
PREPARING MEALS: INDEPENDENT
DRESSING: INDEPENDENT
USING TRANSPORTATION: INDEPENDENT
TOILETING: INDEPENDENT
NEEDS ASSISTANCE WITH FOOD: INDEPENDENT
ADEQUATE_TO_COMPLETE_ADL: YES
MANAGING FINANCES: INDEPENDENT
BATHING: INDEPENDENT
TAKING MEDICATION: INDEPENDENT
JUDGMENT_ADEQUATE_SAFELY_COMPLETE_DAILY_ACTIVITIES: YES
DOING HOUSEWORK: INDEPENDENT
GROCERY SHOPPING: INDEPENDENT
STIL DRIVING: YES

## 2024-05-01 ASSESSMENT — PAIN SCALES - GENERAL: PAINLEVEL: 0-NO PAIN

## 2024-05-01 ASSESSMENT — COGNITIVE AND FUNCTIONAL STATUS - GENERAL: VERBAL FLUENCY - ANIMAL NAMES (0 TO 25): 3

## 2024-05-01 NOTE — PROGRESS NOTES
Subjective   Patient ID: Erica Smith is a 79 y.o. female who presents for Annual Exam.    Mini Cognitive Screening:          Three Word Recall             Words:  Book, apple, window.            Patient able to repeat?  Yes .         Three Word Recall after 5mins             Word recall Score (0-3):  3 .         Clock Drawing             Given preprinted [place] and instructions?  Yes .            Clock Draw Score (0 or 2):  2 .         Clock Draw plus Word Recall Score             Mini Cog Result <3 Pos:  5 .         Follow-up:             .  Not needed, negative result .     General Comments:          Patient here for Medicare Wellness Exam.         Active medical problems:         PMH/PSH/FMH/SHX: reviewed, noted below.         Other providers:         Medications: reviewed, noted below.         Depression screening: denies feeling down, depressed, hopeless. Denies having felt little interest or pleasure in doing things.         Functional ability and safety: Get up and go test is <30s. Pt denies any issues with ADLs, including phone, transportation, shopping, preparing meals, housework,laundry, medications or managing money. No home safety concerns, including having rugs in the hallway, lack grab bars in the bathroom, lack handrails, on the stairs or have poor lighting. Pt denies falls.         Cognitive evaluation: MMSE         Hearing changes: Pt denies changes or concerns.         Advanced directives: discussed and recommended.          Preventative services: sheet reviewed, scanned, copy provided to patient.     Hyperlipidemia:          Patient here for F/U. currently trying to control with therapeutic lifestyle changes, she is unable to take statins, she was found to have non specific changes on her MRI - no CVA noted, but she does have microvascular disease.          Labs ordered today.          she does not tolerate statins - has tried ezetimide.     Hypertension:          Patient here for F/U. at  goal.          Med compliance yes.          Med side effects none.      Gastroenterology:          she has a small hiatal hernia in on her CT chest - she states it is stable, no symptoms.     -Pulmonology:          she has a smoking history with stable pulmonary nodules.     Hypothyroidism:          Follow Up her thyroid was overtreated at 137 mcg a day. When she dropped down to 100 she felt muscle cramps and fatigue, she is now on 125 mcg and will check her labs in a couple of weeks - but with side effects.          Hypothyroidism tolerating meds with no side effects- feels tired on lower doses.      General Endocrinology:          she has mildly elevated sugars - she has not been taking her metformin - she has been trying to follow a healthy diet, she has good and bad days with her diet     Medications reviewed:          Current medications Use reviewed and recorded.          Vitamin/Mineral supplements Use reviewed and recorded.   Her colonoscopy is due soon - referral to Dr Nicholas, mammogram ordered - labs ordered, prevnar 20 given     Review family history:          Reviewed See family history.      Review past history:          Reviewed See past history.      Review surgical history:          Reviewed See surgical history.          Review of Systems   Constitutional:  Positive for fatigue. Negative for chills and fever.   HENT:  Negative for ear pain, postnasal drip and tinnitus.    Respiratory:  Negative for cough, shortness of breath and wheezing.    Cardiovascular:  Negative for chest pain, palpitations and leg swelling.   Gastrointestinal:  Negative for constipation, nausea and vomiting.   Genitourinary:  Negative for frequency and hematuria.   Musculoskeletal:  Positive for arthralgias and myalgias.   Neurological:  Negative for dizziness, tremors and weakness.   Psychiatric/Behavioral:  Negative for confusion. The patient is not nervous/anxious.    It D def, nocturia    Objective   /80   Pulse 73    Ht 1.524 m (5')   Wt 84.4 kg (186 lb)   SpO2 98%   BMI 36.33 kg/m²     Physical Exam  Vitals reviewed.   Constitutional:       General: She is not in acute distress.     Appearance: Normal appearance. She is not ill-appearing.   HENT:      Right Ear: Tympanic membrane normal.      Left Ear: Tympanic membrane normal.      Nose: No congestion or rhinorrhea.      Mouth/Throat:      Pharynx: No posterior oropharyngeal erythema.   Eyes:      General:         Right eye: No discharge.         Left eye: No discharge.   Cardiovascular:      Rate and Rhythm: Normal rate and regular rhythm.      Heart sounds: Normal heart sounds. No murmur heard.  Pulmonary:      Breath sounds: Normal breath sounds.   Chest:   Breasts:     Right: Normal. No mass, nipple discharge, skin change or tenderness.      Left: Normal. No mass, nipple discharge, skin change or tenderness.   Abdominal:      General: Abdomen is flat. Bowel sounds are normal.      Palpations: Abdomen is soft.   Genitourinary:     Comments: Patient declines  Musculoskeletal:      Cervical back: Neck supple.      Right lower leg: No edema.      Left lower leg: No edema.   Skin:     Findings: No rash.   Neurological:      General: No focal deficit present.      Mental Status: She is alert and oriented to person, place, and time.   Psychiatric:         Mood and Affect: Mood normal.         Behavior: Behavior normal.         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    Hypothyroid E03.9    Relevant Orders    TSH with reflex to Free T4 if abnormal    Thyroxine, Free    Hyperlipidemia E78.5    Relevant Orders    Comprehensive Metabolic Panel    Lipid Panel    Vitamin D deficiency E55.9    Relevant Orders    Vitamin D 25-Hydroxy,Total (for eval of Vitamin D levels)    Obesity, morbid (Multi) E66.01    Stage 3a chronic kidney disease (Multi) N18.31     Other Visit Diagnoses          Codes    Medicare annual wellness visit, subsequent    -  Primary Z00.00    Fatigue, unspecified type     R53.83    Relevant Orders    CBC and Auto Differential    Nocturia     R35.1    Relevant Orders    Urinalysis with Reflex Culture and Microscopic    Colon cancer screening     Z12.11    Relevant Orders    Referral to Gastroenterology    Screening mammogram for breast cancer     Z12.31    Relevant Orders    BI mammo bilateral screening tomosynthesis    Encounter for immunization     Z23    Encounter for screening for malignant neoplasm of colon     Z12.11    referral to Dr Nicholas for a colonoscopy

## 2024-05-02 ENCOUNTER — LAB (OUTPATIENT)
Dept: LAB | Facility: LAB | Age: 79
End: 2024-05-02
Payer: COMMERCIAL

## 2024-05-02 DIAGNOSIS — E03.9 ACQUIRED HYPOTHYROIDISM: Primary | ICD-10-CM

## 2024-05-02 DIAGNOSIS — E78.2 MIXED HYPERLIPIDEMIA: ICD-10-CM

## 2024-05-02 DIAGNOSIS — E55.9 VITAMIN D DEFICIENCY: ICD-10-CM

## 2024-05-02 DIAGNOSIS — R53.83 FATIGUE, UNSPECIFIED TYPE: ICD-10-CM

## 2024-05-02 DIAGNOSIS — R35.1 NOCTURIA: ICD-10-CM

## 2024-05-02 DIAGNOSIS — E03.9 ACQUIRED HYPOTHYROIDISM: ICD-10-CM

## 2024-05-02 LAB
25(OH)D3 SERPL-MCNC: 36 NG/ML (ref 31–100)
ALBUMIN SERPL-MCNC: 4.3 G/DL (ref 3.5–5)
ALP BLD-CCNC: 93 U/L (ref 35–125)
ALT SERPL-CCNC: 10 U/L (ref 5–40)
ANION GAP SERPL CALC-SCNC: 18 MMOL/L
APPEARANCE UR: CLEAR
AST SERPL-CCNC: 13 U/L (ref 5–40)
BASOPHILS # BLD AUTO: 0.1 X10*3/UL (ref 0–0.1)
BASOPHILS NFR BLD AUTO: 1 %
BILIRUB SERPL-MCNC: 0.7 MG/DL (ref 0.1–1.2)
BILIRUB UR STRIP.AUTO-MCNC: NEGATIVE MG/DL
BUN SERPL-MCNC: 19 MG/DL (ref 8–25)
CALCIUM SERPL-MCNC: 10 MG/DL (ref 8.5–10.4)
CHLORIDE SERPL-SCNC: 98 MMOL/L (ref 97–107)
CHOLEST SERPL-MCNC: 220 MG/DL (ref 133–200)
CHOLEST/HDLC SERPL: 3.8 {RATIO}
CO2 SERPL-SCNC: 23 MMOL/L (ref 24–31)
COLOR UR: YELLOW
CREAT SERPL-MCNC: 0.9 MG/DL (ref 0.4–1.6)
EGFRCR SERPLBLD CKD-EPI 2021: 66 ML/MIN/1.73M*2
EOSINOPHIL # BLD AUTO: 0.11 X10*3/UL (ref 0–0.4)
EOSINOPHIL NFR BLD AUTO: 1.1 %
ERYTHROCYTE [DISTWIDTH] IN BLOOD BY AUTOMATED COUNT: 12.5 % (ref 11.5–14.5)
GLUCOSE SERPL-MCNC: 119 MG/DL (ref 65–99)
GLUCOSE UR STRIP.AUTO-MCNC: NORMAL MG/DL
HCT VFR BLD AUTO: 44.2 % (ref 36–46)
HDLC SERPL-MCNC: 58 MG/DL
HGB BLD-MCNC: 14.4 G/DL (ref 12–16)
HOLD SPECIMEN: NORMAL
HYALINE CASTS #/AREA URNS AUTO: ABNORMAL /LPF
IMM GRANULOCYTES # BLD AUTO: 0.04 X10*3/UL (ref 0–0.5)
IMM GRANULOCYTES NFR BLD AUTO: 0.4 % (ref 0–0.9)
KETONES UR STRIP.AUTO-MCNC: NEGATIVE MG/DL
LDLC SERPL CALC-MCNC: 139 MG/DL (ref 65–130)
LEUKOCYTE ESTERASE UR QL STRIP.AUTO: ABNORMAL
LYMPHOCYTES # BLD AUTO: 1.79 X10*3/UL (ref 0.8–3)
LYMPHOCYTES NFR BLD AUTO: 17.4 %
MCH RBC QN AUTO: 30.1 PG (ref 26–34)
MCHC RBC AUTO-ENTMCNC: 32.6 G/DL (ref 32–36)
MCV RBC AUTO: 92 FL (ref 80–100)
MONOCYTES # BLD AUTO: 0.75 X10*3/UL (ref 0.05–0.8)
MONOCYTES NFR BLD AUTO: 7.3 %
MUCOUS THREADS #/AREA URNS AUTO: ABNORMAL /LPF
NEUTROPHILS # BLD AUTO: 7.52 X10*3/UL (ref 1.6–5.5)
NEUTROPHILS NFR BLD AUTO: 72.8 %
NITRITE UR QL STRIP.AUTO: NEGATIVE
NRBC BLD-RTO: 0 /100 WBCS (ref 0–0)
PH UR STRIP.AUTO: 5.5 [PH]
PLATELET # BLD AUTO: 330 X10*3/UL (ref 150–450)
POTASSIUM SERPL-SCNC: 4.9 MMOL/L (ref 3.4–5.1)
PROT SERPL-MCNC: 7.1 G/DL (ref 5.9–7.9)
PROT UR STRIP.AUTO-MCNC: NEGATIVE MG/DL
RBC # BLD AUTO: 4.79 X10*6/UL (ref 4–5.2)
RBC # UR STRIP.AUTO: ABNORMAL /UL
RBC #/AREA URNS AUTO: ABNORMAL /HPF
SODIUM SERPL-SCNC: 139 MMOL/L (ref 133–145)
SP GR UR STRIP.AUTO: 1.02
SQUAMOUS #/AREA URNS AUTO: ABNORMAL /HPF
T4 FREE SERPL-MCNC: 1.8 NG/DL (ref 0.9–1.7)
TRIGL SERPL-MCNC: 117 MG/DL (ref 40–150)
TSH SERPL DL<=0.05 MIU/L-ACNC: 0.07 MIU/L (ref 0.27–4.2)
UROBILINOGEN UR STRIP.AUTO-MCNC: NORMAL MG/DL
WBC # BLD AUTO: 10.3 X10*3/UL (ref 4.4–11.3)
WBC #/AREA URNS AUTO: ABNORMAL /HPF

## 2024-05-02 PROCEDURE — 82306 VITAMIN D 25 HYDROXY: CPT

## 2024-05-02 PROCEDURE — 87086 URINE CULTURE/COLONY COUNT: CPT

## 2024-05-02 PROCEDURE — 80053 COMPREHEN METABOLIC PANEL: CPT

## 2024-05-02 PROCEDURE — 84439 ASSAY OF FREE THYROXINE: CPT

## 2024-05-02 PROCEDURE — 81001 URINALYSIS AUTO W/SCOPE: CPT

## 2024-05-02 PROCEDURE — 36415 COLL VENOUS BLD VENIPUNCTURE: CPT

## 2024-05-02 PROCEDURE — 85025 COMPLETE CBC W/AUTO DIFF WBC: CPT

## 2024-05-02 PROCEDURE — 80061 LIPID PANEL: CPT

## 2024-05-02 PROCEDURE — 84443 ASSAY THYROID STIM HORMONE: CPT

## 2024-05-02 RX ORDER — LEVOTHYROXINE SODIUM 100 UG/1
100 TABLET ORAL DAILY
Qty: 90 TABLET | Refills: 0 | Status: SHIPPED | OUTPATIENT
Start: 2024-05-02 | End: 2024-05-06 | Stop reason: SINTOL

## 2024-05-03 LAB — BACTERIA UR CULT: NORMAL

## 2024-05-06 ENCOUNTER — TELEPHONE (OUTPATIENT)
Dept: PRIMARY CARE | Facility: CLINIC | Age: 79
End: 2024-05-06
Payer: COMMERCIAL

## 2024-05-06 DIAGNOSIS — E03.9 HYPOTHYROIDISM, UNSPECIFIED TYPE: ICD-10-CM

## 2024-05-06 RX ORDER — LEVOTHYROXINE SODIUM 100 UG/1
100 TABLET ORAL DAILY
Qty: 90 TABLET | Refills: 0 | Status: SHIPPED | OUTPATIENT
Start: 2024-05-06 | End: 2024-08-04

## 2024-05-06 NOTE — TELEPHONE ENCOUNTER
Patient notified of recent lab results. Please re send patient's thyroid medication. This needs to be Synthroid (CARLOS A). She agrees to try Zetia. Please send to pharmacy. Future thyroid lab orders placed. [facility] in [place].

## 2024-05-17 ENCOUNTER — APPOINTMENT (OUTPATIENT)
Dept: RADIOLOGY | Facility: HOSPITAL | Age: 79
End: 2024-05-17
Payer: COMMERCIAL

## 2024-05-21 ENCOUNTER — HOSPITAL ENCOUNTER (OUTPATIENT)
Dept: RADIOLOGY | Facility: HOSPITAL | Age: 79
Discharge: HOME | End: 2024-05-21
Payer: COMMERCIAL

## 2024-05-21 VITALS — HEIGHT: 60 IN | BODY MASS INDEX: 36.52 KG/M2 | WEIGHT: 186 LBS

## 2024-05-21 DIAGNOSIS — Z12.31 SCREENING MAMMOGRAM FOR BREAST CANCER: ICD-10-CM

## 2024-05-21 PROCEDURE — 77063 BREAST TOMOSYNTHESIS BI: CPT | Performed by: RADIOLOGY

## 2024-05-21 PROCEDURE — 77067 SCR MAMMO BI INCL CAD: CPT

## 2024-05-21 PROCEDURE — 77067 SCR MAMMO BI INCL CAD: CPT | Performed by: RADIOLOGY

## 2024-05-26 DIAGNOSIS — Z12.31 SCREENING MAMMOGRAM FOR BREAST CANCER: Primary | ICD-10-CM

## 2024-06-24 ENCOUNTER — LAB (OUTPATIENT)
Dept: LAB | Facility: LAB | Age: 79
End: 2024-06-24
Payer: COMMERCIAL

## 2024-06-24 DIAGNOSIS — E03.9 HYPOTHYROIDISM, UNSPECIFIED TYPE: ICD-10-CM

## 2024-06-24 DIAGNOSIS — E03.9 ACQUIRED HYPOTHYROIDISM: ICD-10-CM

## 2024-06-24 DIAGNOSIS — R73.03 PREDIABETES: ICD-10-CM

## 2024-06-24 LAB
T4 FREE SERPL-MCNC: 1.8 NG/DL (ref 0.9–1.7)
TSH SERPL DL<=0.05 MIU/L-ACNC: 0.34 MIU/L (ref 0.27–4.2)

## 2024-06-24 PROCEDURE — 80048 BASIC METABOLIC PNL TOTAL CA: CPT

## 2024-06-24 PROCEDURE — 84439 ASSAY OF FREE THYROXINE: CPT

## 2024-06-24 PROCEDURE — 36415 COLL VENOUS BLD VENIPUNCTURE: CPT

## 2024-06-24 PROCEDURE — 84443 ASSAY THYROID STIM HORMONE: CPT

## 2024-06-25 ENCOUNTER — TELEPHONE (OUTPATIENT)
Dept: PRIMARY CARE | Facility: CLINIC | Age: 79
End: 2024-06-25
Payer: COMMERCIAL

## 2024-06-25 DIAGNOSIS — E03.9 ACQUIRED HYPOTHYROIDISM: ICD-10-CM

## 2024-06-25 DIAGNOSIS — E03.9 HYPOTHYROIDISM, UNSPECIFIED TYPE: ICD-10-CM

## 2024-06-25 DIAGNOSIS — R73.03 PREDIABETES: Primary | ICD-10-CM

## 2024-06-25 LAB
ANION GAP SERPL CALC-SCNC: 19 MMOL/L
BUN SERPL-MCNC: 11 MG/DL (ref 8–25)
CALCIUM SERPL-MCNC: 9.4 MG/DL (ref 8.5–10.4)
CHLORIDE SERPL-SCNC: 102 MMOL/L (ref 97–107)
CO2 SERPL-SCNC: 20 MMOL/L (ref 24–31)
CREAT SERPL-MCNC: 0.8 MG/DL (ref 0.4–1.6)
EGFRCR SERPLBLD CKD-EPI 2021: 76 ML/MIN/1.73M*2
GLUCOSE SERPL-MCNC: 135 MG/DL (ref 65–99)
POTASSIUM SERPL-SCNC: 4.3 MMOL/L (ref 3.4–5.1)
SODIUM SERPL-SCNC: 141 MMOL/L (ref 133–145)

## 2024-06-25 RX ORDER — LEVOTHYROXINE SODIUM 100 UG/1
TABLET ORAL
Qty: 80 TABLET | Refills: 1 | Status: SHIPPED | OUTPATIENT
Start: 2024-06-25

## 2024-06-25 NOTE — TELEPHONE ENCOUNTER
Patient notified of recent lab results. She agrees to decrease her Levothyroxine dose. She will need a new RX to [facility] in [place]. Future lab orders placed to re check in 6 weeks.

## 2024-06-25 NOTE — PROGRESS NOTES
Orders placed for a HgA1c and thyroid studies for 8/10/2024  - I recommend she follow a low sugar diet and stay active - we can recheck labs in August to see if she needs to add back metformin

## 2024-06-25 NOTE — TELEPHONE ENCOUNTER
Orders placed for a HgA1C and thyroid recheck 8/8/2024 - recommend a low sugar diet and stay active, we can decide after we recheck her Hga1C in August if she should go back on metformin

## 2024-06-25 NOTE — TELEPHONE ENCOUNTER
----- Message from Carmelo Jensen MD sent at 6/24/2024 10:10 PM EDT -----    ----- Message -----  From: Lab, Background User  Sent: 6/24/2024   9:27 PM EDT  To: Carmelo Jensen MD

## 2024-08-06 ENCOUNTER — LAB (OUTPATIENT)
Dept: LAB | Facility: LAB | Age: 79
End: 2024-08-06
Payer: COMMERCIAL

## 2024-08-06 DIAGNOSIS — R73.03 PREDIABETES: ICD-10-CM

## 2024-08-06 DIAGNOSIS — E03.9 ACQUIRED HYPOTHYROIDISM: ICD-10-CM

## 2024-08-06 LAB
EST. AVERAGE GLUCOSE BLD GHB EST-MCNC: 151 MG/DL
HBA1C MFR BLD: 6.9 %
T4 FREE SERPL-MCNC: 1.5 NG/DL (ref 0.9–1.7)
TSH SERPL DL<=0.05 MIU/L-ACNC: 3.21 MIU/L (ref 0.27–4.2)

## 2024-08-06 PROCEDURE — 83036 HEMOGLOBIN GLYCOSYLATED A1C: CPT

## 2024-08-06 PROCEDURE — 36415 COLL VENOUS BLD VENIPUNCTURE: CPT

## 2024-08-06 PROCEDURE — 84443 ASSAY THYROID STIM HORMONE: CPT

## 2024-08-06 PROCEDURE — 84439 ASSAY OF FREE THYROXINE: CPT

## 2024-08-09 ENCOUNTER — OFFICE VISIT (OUTPATIENT)
Dept: PRIMARY CARE | Facility: CLINIC | Age: 79
End: 2024-08-09
Payer: COMMERCIAL

## 2024-08-09 VITALS
OXYGEN SATURATION: 97 % | DIASTOLIC BLOOD PRESSURE: 68 MMHG | HEIGHT: 60 IN | HEART RATE: 80 BPM | BODY MASS INDEX: 36.71 KG/M2 | SYSTOLIC BLOOD PRESSURE: 120 MMHG | WEIGHT: 187 LBS

## 2024-08-09 DIAGNOSIS — R10.2 SUPRAPUBIC PRESSURE: Primary | ICD-10-CM

## 2024-08-09 PROCEDURE — 99213 OFFICE O/P EST LOW 20 MIN: CPT

## 2024-08-09 PROCEDURE — 3074F SYST BP LT 130 MM HG: CPT

## 2024-08-09 PROCEDURE — 1123F ACP DISCUSS/DSCN MKR DOCD: CPT

## 2024-08-09 PROCEDURE — 1159F MED LIST DOCD IN RCRD: CPT

## 2024-08-09 PROCEDURE — 1125F AMNT PAIN NOTED PAIN PRSNT: CPT

## 2024-08-09 PROCEDURE — 1158F ADVNC CARE PLAN TLK DOCD: CPT

## 2024-08-09 PROCEDURE — 3078F DIAST BP <80 MM HG: CPT

## 2024-08-09 PROCEDURE — 1036F TOBACCO NON-USER: CPT

## 2024-08-09 RX ORDER — CEPHALEXIN 500 MG/1
500 CAPSULE ORAL 2 TIMES DAILY
Qty: 10 CAPSULE | Refills: 0 | Status: SHIPPED | OUTPATIENT
Start: 2024-08-09 | End: 2024-08-14

## 2024-08-09 ASSESSMENT — ENCOUNTER SYMPTOMS
ABDOMINAL PAIN: 1
FLANK PAIN: 0
HEMATURIA: 0
NAUSEA: 0
FEVER: 0
DYSURIA: 0
CONSTIPATION: 0
VOMITING: 0
CHILLS: 1
FREQUENCY: 0
DIARRHEA: 0

## 2024-08-09 ASSESSMENT — PATIENT HEALTH QUESTIONNAIRE - PHQ9
SUM OF ALL RESPONSES TO PHQ9 QUESTIONS 1 AND 2: 0
1. LITTLE INTEREST OR PLEASURE IN DOING THINGS: NOT AT ALL
2. FEELING DOWN, DEPRESSED OR HOPELESS: NOT AT ALL

## 2024-08-09 ASSESSMENT — PAIN SCALES - GENERAL: PAINLEVEL: 6

## 2024-08-09 NOTE — PROGRESS NOTES
Subjective   Patient ID: Erica Smith is a 79 y.o. female who presents for UTI (Pelvic pain, denies burning or frequency of urination).    Lower abdominal pain x 2-3 days, has been significantly improving. +Chills. Last UTI about 4 months ago, and feels similar. Denies dysuria, frequency, urgency, hematuria, fever, nausea, vomiting, flank pain, diarrhea, constipation, blood in stool, hx of diverticulitis.           Review of Systems   Constitutional:  Positive for chills. Negative for fever.   Gastrointestinal:  Positive for abdominal pain. Negative for constipation, diarrhea, nausea and vomiting.   Genitourinary:  Negative for dysuria, flank pain, frequency, hematuria, urgency, vaginal bleeding and vaginal discharge.       Objective   /68   Pulse 80   Ht 1.524 m (5')   Wt 84.8 kg (187 lb)   SpO2 97%   BMI 36.52 kg/m²     Physical Exam  Constitutional:       Appearance: Normal appearance.   Cardiovascular:      Rate and Rhythm: Normal rate and regular rhythm.      Heart sounds: No murmur heard.  Pulmonary:      Effort: Pulmonary effort is normal.      Breath sounds: Normal breath sounds. No wheezing, rhonchi or rales.   Abdominal:      General: Abdomen is flat. Bowel sounds are normal.      Palpations: Abdomen is soft. There is no hepatomegaly, splenomegaly or mass.      Tenderness: There is abdominal tenderness in the suprapubic area. There is no right CVA tenderness, left CVA tenderness, guarding or rebound.   Skin:     General: Skin is warm and dry.      Findings: No rash.   Neurological:      Mental Status: She is alert.   Psychiatric:         Mood and Affect: Mood and affect normal.         Assessment/Plan   Diagnoses and all orders for this visit:  Suprapubic pressure  -     cephalexin (Keflex) 500 mg capsule; Take 1 capsule (500 mg) by mouth 2 times a day for 5 days.  -     Urinalysis with Reflex Culture and Microscopic; Future  Unable to leave urine sample, given urine cup to drop off the lab  but since going into the weekend will treat for possible UTI. If urine normal and pain persist can consider US.

## 2024-08-10 ENCOUNTER — LAB (OUTPATIENT)
Dept: LAB | Facility: LAB | Age: 79
End: 2024-08-10
Payer: COMMERCIAL

## 2024-08-10 DIAGNOSIS — R10.2 SUPRAPUBIC PRESSURE: ICD-10-CM

## 2024-08-10 LAB
APPEARANCE UR: CLEAR
BILIRUB UR STRIP.AUTO-MCNC: NEGATIVE MG/DL
COLOR UR: YELLOW
GLUCOSE UR STRIP.AUTO-MCNC: NORMAL MG/DL
HOLD SPECIMEN: NORMAL
KETONES UR STRIP.AUTO-MCNC: NEGATIVE MG/DL
LEUKOCYTE ESTERASE UR QL STRIP.AUTO: ABNORMAL
MUCOUS THREADS #/AREA URNS AUTO: NORMAL /LPF
NITRITE UR QL STRIP.AUTO: NEGATIVE
PH UR STRIP.AUTO: 5.5 [PH]
PROT UR STRIP.AUTO-MCNC: ABNORMAL MG/DL
RBC # UR STRIP.AUTO: ABNORMAL /UL
RBC #/AREA URNS AUTO: NORMAL /HPF
SP GR UR STRIP.AUTO: 1.03
SQUAMOUS #/AREA URNS AUTO: NORMAL /HPF
UROBILINOGEN UR STRIP.AUTO-MCNC: ABNORMAL MG/DL
WBC #/AREA URNS AUTO: NORMAL /HPF

## 2024-08-10 PROCEDURE — 81001 URINALYSIS AUTO W/SCOPE: CPT

## 2024-08-10 PROCEDURE — 87086 URINE CULTURE/COLONY COUNT: CPT

## 2024-08-11 LAB — BACTERIA UR CULT: NORMAL

## 2024-08-12 ENCOUNTER — TELEPHONE (OUTPATIENT)
Dept: PRIMARY CARE | Facility: CLINIC | Age: 79
End: 2024-08-12
Payer: COMMERCIAL

## 2024-08-12 NOTE — TELEPHONE ENCOUNTER
Urine culture was negative for bacterial growth, can stop antibiotic. If pelvic pressure persist for another week, call and will order US.

## 2024-08-21 ENCOUNTER — OFFICE VISIT (OUTPATIENT)
Dept: PRIMARY CARE | Facility: CLINIC | Age: 79
End: 2024-08-21
Payer: COMMERCIAL

## 2024-08-21 VITALS
SYSTOLIC BLOOD PRESSURE: 120 MMHG | WEIGHT: 185 LBS | DIASTOLIC BLOOD PRESSURE: 70 MMHG | BODY MASS INDEX: 36.13 KG/M2 | HEART RATE: 76 BPM | OXYGEN SATURATION: 99 %

## 2024-08-21 DIAGNOSIS — E03.9 ACQUIRED HYPOTHYROIDISM: Primary | ICD-10-CM

## 2024-08-21 DIAGNOSIS — E78.2 MIXED HYPERLIPIDEMIA: ICD-10-CM

## 2024-08-21 DIAGNOSIS — R73.03 PREDIABETES: ICD-10-CM

## 2024-08-21 DIAGNOSIS — E11.9 TYPE 2 DIABETES MELLITUS WITHOUT COMPLICATION, WITHOUT LONG-TERM CURRENT USE OF INSULIN (MULTI): ICD-10-CM

## 2024-08-21 PROCEDURE — G2211 COMPLEX E/M VISIT ADD ON: HCPCS | Performed by: FAMILY MEDICINE

## 2024-08-21 PROCEDURE — 1159F MED LIST DOCD IN RCRD: CPT | Performed by: FAMILY MEDICINE

## 2024-08-21 PROCEDURE — 1126F AMNT PAIN NOTED NONE PRSNT: CPT | Performed by: FAMILY MEDICINE

## 2024-08-21 PROCEDURE — 1160F RVW MEDS BY RX/DR IN RCRD: CPT | Performed by: FAMILY MEDICINE

## 2024-08-21 PROCEDURE — 99214 OFFICE O/P EST MOD 30 MIN: CPT | Performed by: FAMILY MEDICINE

## 2024-08-21 PROCEDURE — 3078F DIAST BP <80 MM HG: CPT | Performed by: FAMILY MEDICINE

## 2024-08-21 PROCEDURE — 3074F SYST BP LT 130 MM HG: CPT | Performed by: FAMILY MEDICINE

## 2024-08-21 PROCEDURE — 1036F TOBACCO NON-USER: CPT | Performed by: FAMILY MEDICINE

## 2024-08-21 RX ORDER — LEVOTHYROXINE SODIUM 125 UG/1
125 TABLET ORAL DAILY
Qty: 90 TABLET | Refills: 0 | Status: SHIPPED | OUTPATIENT
Start: 2024-08-21 | End: 2025-08-21

## 2024-08-21 RX ORDER — METFORMIN HYDROCHLORIDE 500 MG/1
500 TABLET, EXTENDED RELEASE ORAL
Qty: 90 TABLET | Refills: 1 | Status: SHIPPED | OUTPATIENT
Start: 2024-08-21 | End: 2025-08-21

## 2024-08-21 ASSESSMENT — ENCOUNTER SYMPTOMS
WEAKNESS: 0
TREMORS: 0
NAUSEA: 0
LOSS OF SENSATION IN FEET: 0
VOMITING: 0
MYALGIAS: 1
DIZZINESS: 0
ARTHRALGIAS: 1
FEVER: 0
HEMATURIA: 0
OCCASIONAL FEELINGS OF UNSTEADINESS: 0
CHILLS: 0
CONSTIPATION: 0
COUGH: 0
FATIGUE: 1
DEPRESSION: 0
CONFUSION: 0
PALPITATIONS: 0
WHEEZING: 0
FREQUENCY: 0
NERVOUS/ANXIOUS: 0
SHORTNESS OF BREATH: 0

## 2024-08-21 ASSESSMENT — PAIN SCALES - GENERAL: PAINLEVEL: 0-NO PAIN

## 2024-08-21 NOTE — PROGRESS NOTES
Subjective   Patient ID: Erica Smith is a 79 y.o. female who presents for Results.     Hyperlipidemia:          Patient here for F/U. currently trying to control with therapeutic lifestyle changes, she is unable to take statins, she was found to have non specific changes on her MRI - no CVA noted, but she does have microvascular disease.          Labs ordered today.          she does not tolerate statins - has tried zetia and tolerated it- much improved chol levels      Hypertension:          Patient here for F/U. at goal.          Med compliance yes.          Med side effects none.      Gastroenterology:          she has a small hiatal hernia in on her CT chest - she states it is stable, no symptoms.     -Pulmonology:          she has a smoking history with stable pulmonary nodules.     Hypothyroidism:          Follow Up her thyroid was overtreated at 137 mcg a day. When she dropped down to 100 she felt muscle cramps and fatigue, she is now on 125 mcg and will check her labs in a couple of weeks - but with side effects.          Hypothyroidism tolerating meds with no side effects- feels tired on lower doses.      General Endocrinology:          she has mildly elevated sugars - she has not been taking her metformin - she has been trying to follow a healthy diet, she has good and bad days with her diet    She is getting left hip pain - she does not want any treatment or xrays at this time           Review of Systems   Constitutional:  Positive for fatigue. Negative for chills and fever.   HENT:  Negative for ear pain and postnasal drip.    Respiratory:  Negative for cough, shortness of breath and wheezing.    Cardiovascular:  Negative for chest pain, palpitations and leg swelling.   Gastrointestinal:  Negative for constipation, nausea and vomiting.   Genitourinary:  Negative for frequency and hematuria.   Musculoskeletal:  Positive for arthralgias and myalgias.   Skin:  Negative for rash.   Neurological:   Negative for dizziness, tremors and weakness.   Psychiatric/Behavioral:  Negative for confusion. The patient is not nervous/anxious.        Objective   /70   Pulse 76   Wt 83.9 kg (185 lb)   SpO2 99%   BMI 36.13 kg/m²     Physical Exam  Vitals reviewed.   Constitutional:       General: She is not in acute distress.     Appearance: Normal appearance. She is not ill-appearing.   HENT:      Right Ear: Tympanic membrane normal.      Left Ear: Tympanic membrane normal.   Cardiovascular:      Rate and Rhythm: Normal rate and regular rhythm.      Heart sounds: Normal heart sounds. No murmur heard.  Pulmonary:      Breath sounds: Normal breath sounds.   Musculoskeletal:      Right lower leg: No edema.      Left lower leg: No edema.      Comments: Left hip with painful ROM   Skin:     Findings: No rash.   Neurological:      General: No focal deficit present.      Mental Status: She is alert and oriented to person, place, and time.         Assessment/Plan   Problem List Items Addressed This Visit             ICD-10-CM    Hypothyroid - Primary E03.9    Relevant Medications    Synthroid 125 mcg tablet    Other Relevant Orders    Referral to Endocrinology    Hyperlipidemia E78.5    Prediabetes R73.03     Other Visit Diagnoses         Codes    Type 2 diabetes mellitus without complication, without long-term current use of insulin (Multi)     E11.9    will start back on metformin once a day     Relevant Medications    metFORMIN XR (Glucophage-XR) 500 mg 24 hr tablet

## 2024-08-21 NOTE — PATIENT INSTRUCTIONS
Plan- diet and exercise- BMI is elevated. Need to increase activity on a daily basis especially walking.  Monitor  total calories per day- decrease carbohydrates and fats. Goal - lose 1-2 pounds per week. Recommend a diet rich in fresh fruits and vegetables. Try to get 25-30 grams of dietary fiber daily.    Get 30 grams of protein with breakfast, lunch and dinner. Try to get 30 grams of protein in within 30 minutes of waking up in the morning. Try not to drink coffee on an empty stomach. Encourage water intake to stay hydrated.    Recommend 150 minutes of moderate-intensity exercise as tolerated per week and 2-3 days of resistance, flexibility, or yoga/ pilates per week. Try to get 10,000 steps a day.    Try to manage your stress and attempt to get 7-8 hours of sleep each night.    Try for no more than 75 grams of carbs a day  Normal BMI- 18.5-25  Overweight=  BMI 26-29  Obese= BMI 30-39  Morbidly Obese = BMI >40

## 2024-09-25 ENCOUNTER — TELEPHONE (OUTPATIENT)
Dept: PRIMARY CARE | Facility: CLINIC | Age: 79
End: 2024-09-25
Payer: COMMERCIAL

## 2024-09-25 DIAGNOSIS — M62.838 SPASM OF MUSCLE: Primary | ICD-10-CM

## 2024-09-25 RX ORDER — TIZANIDINE 4 MG/1
4 TABLET ORAL EVERY 8 HOURS PRN
Qty: 30 TABLET | Refills: 0 | Status: SHIPPED | OUTPATIENT
Start: 2024-09-25 | End: 2024-10-05

## 2024-09-25 NOTE — TELEPHONE ENCOUNTER
Patient fell in her driveway yesterday. She said she did not break anything and did not hit her head. She is just sore today. She is requesting a muscle relaxer to [facility] in [place].

## 2024-11-12 NOTE — PROGRESS NOTES
HPI   78 yo presents as new pt for thyroid evaluation.  Pt with IFG, htn, dyslipidemia, hiatal hernia, pulmonary nodules.    Thyroid:  -pt with partial thyroidectomy 1983 for nodular thyroid  -pt had been on as much as 200 mcg synthroid, doses have been lowered over the years  -when dosage started to go under 150 mcg pt noticed changes in her sx  -from 2016 on pt stopped working 2 jobs, wt has increased, pt not feeling as well      - currently on 125 mcg levothyroxine and feeling tired/not able to lose wt/cold    8/24 tsh-3.21, 6/24-0.34, 5/24-0.07 (pt unclear what dose she was on when this was taken)  -pt relates having muscle cramps/more fatigue when on 100mcg dosage in the past  -pt was hyperthyroid on 137 mcg    PMH/PSH:  -as above  -tonsillectomy and adenoids  -appendectomy  -tubal ligation  -shoulder surgery  -carpal tunnel surgery    SH:  -previous tobacco, rare alcohol    FH:  Mom-endometrial cancer, arthritis  Dad-cancer, alcoholism, cirrhosis  Brother-dm2, drug addiction  Sister-thyroid disease      Current Outpatient Medications:     dupilumab (Dupixent Pen) 300 mg/2 mL injection, Inject under the skin., Disp: , Rfl:     latanoprost (Xalatan) 0.005 % ophthalmic solution, Administer 1 drop into both eyes once daily at bedtime., Disp: , Rfl:     metFORMIN XR (Glucophage-XR) 500 mg 24 hr tablet, Take 1 tablet (500 mg) by mouth once daily with breakfast. Do not crush, chew, or split., Disp: 90 tablet, Rfl: 1    Synthroid 125 mcg tablet, Take 1 tablet (125 mcg) by mouth early in the morning.. Take on an empty stomach at the same time each day, either 30 to 60 minutes prior to breakfast, Disp: 90 tablet, Rfl: 0    tiZANidine (Zanaflex) 4 mg tablet, Take 1 tablet (4 mg) by mouth every 8 hours if needed for muscle spasms (do not take and drive) for up to 10 days., Disp: 30 tablet, Rfl: 0      Allergies as of 11/13/2024 - Reviewed 11/13/2024   Allergen Reaction Noted    Adhesive tape-silicones Unknown 09/25/2023  "   Ciprofloxacin Hives and Unknown 09/25/2023    Metoprolol Unknown 09/25/2023    Rosuvastatin Unknown 09/25/2023    Statins-hmg-coa reductase inhibitors Other 09/25/2023    Sulfa (sulfonamide antibiotics) Hives, Itching, and Unknown 09/25/2023    Adhesive Rash 09/25/2023    Lanolin alcohols Itching 09/25/2023    Phisoderm Unknown and Itching 09/25/2023         Review of Systems   Cardiology: Lightheadedness-denies.  Chest pain-denies.  Leg edema-denies.  Palpitations-denies.  Respiratory: Cough-denies. Shortness of breath-denies.  Wheezing-denies.  Gastroenterology: Constipation-denies.  Diarrhea-denies.  Heartburn-denies.  Endocrinology: Cold intolerance + Heat intolerance-denies.  Sweats-denies.  Neurology: Headache-denies.  Tremor-denies.  Neuropathy in extremities-denies.  Psychology: Low energy +.  Irritability-denies.  Sleep disturbances-denies.      /70 (BP Location: Right arm, Patient Position: Sitting)   Pulse 70   Ht 1.524 m (5')   Wt 83.5 kg (184 lb)   BMI 35.94 kg/m²       Labs:  Lab Results   Component Value Date    WBC 10.3 05/02/2024    NRBC 0.0 05/02/2024    RBC 4.79 05/02/2024    HGB 14.4 05/02/2024    HCT 44.2 05/02/2024     05/02/2024     Lab Results   Component Value Date    CALCIUM 9.4 06/24/2024    AST 13 05/02/2024    ALKPHOS 93 05/02/2024    BILITOT 0.7 05/02/2024    PROT 7.1 05/02/2024    ALBUMIN 4.3 05/02/2024    GLOB 3.0 06/30/2023    AGR 1.3 (L) 06/30/2023     06/24/2024    K 4.3 06/24/2024     06/24/2024    CO2 20 (L) 06/24/2024    ANIONGAP 19 06/24/2024    BUN 11 06/24/2024    CREATININE 0.80 06/24/2024    UREACREAUR 13.6 06/30/2023    GLUCOSE 135 (H) 06/24/2024    ALT 10 05/02/2024    EGFR 76 06/24/2024     Lab Results   Component Value Date    CHOL 220 (H) 05/02/2024    TRIG 117 05/02/2024    HDL 58.0 05/02/2024    LDLCALC 139 (H) 05/02/2024     No results found for: \"MICROALBCREA\"  Lab Results   Component Value Date    TSH 3.21 08/06/2024     Lab Results "   Component Value Date    [id number] 386 03/02/2023     Lab Results   Component Value Date    HGBA1C 6.9 (H) 08/06/2024         Physical Exam   General Appearance: pleasant, cooperative, no acute distress  HEENT: no chemosis, no proptosis, no lid lag, no lid retraction  Neck: no lymphadenopathy, no thyromegaly, no dominant thyroid nodules  Heart: no murmurs, regular rate and rhythm, S1 and S2  Lungs: no wheezes, no rhonci, no rales  Extremities: no lower extremity swelling      Assessment/Plan   1. Acquired hypothyroidism (Primary)  -certainly sx of energy/weight are very non specific and can be for a multitude of reasons    -can give trial synthroid 137 mcg (roma) at bedtime and repeat labs in 2 months    -would run thyroid on higher range of normal but not under a tsh of 0.1  -would have to monitor heart rate/sx very closely, pt is aware of potential of elevated heart rate/abnormal heart rhythm and increased bone turnover is she takes extra thyroid hormone and would like to proceed    -another option if no luck with this is to give a trial of desiccated thyroid hormone in the form of armour thyroid likely at a starting dose of 90 mg as the next move    2. Weight gain  -reviewed plan set forth by Dr. Jensen and agree with lifestyle changes  -using ozempic through [facility] is an option as the least expensive way of obtaining without insurance coverage <$200/month        Follow Up:  [facility] 4 months    -labs/tests/notes reviewed  -reviewed and counseled patient on medication monitoring and side effects

## 2024-11-13 ENCOUNTER — APPOINTMENT (OUTPATIENT)
Dept: ENDOCRINOLOGY | Facility: CLINIC | Age: 79
End: 2024-11-13
Payer: COMMERCIAL

## 2024-11-13 VITALS
WEIGHT: 184 LBS | HEART RATE: 70 BPM | HEIGHT: 60 IN | SYSTOLIC BLOOD PRESSURE: 149 MMHG | BODY MASS INDEX: 36.12 KG/M2 | DIASTOLIC BLOOD PRESSURE: 70 MMHG

## 2024-11-13 DIAGNOSIS — E03.9 ACQUIRED HYPOTHYROIDISM: Primary | ICD-10-CM

## 2024-11-13 PROCEDURE — 3078F DIAST BP <80 MM HG: CPT | Performed by: INTERNAL MEDICINE

## 2024-11-13 PROCEDURE — 1125F AMNT PAIN NOTED PAIN PRSNT: CPT | Performed by: INTERNAL MEDICINE

## 2024-11-13 PROCEDURE — 3077F SYST BP >= 140 MM HG: CPT | Performed by: INTERNAL MEDICINE

## 2024-11-13 PROCEDURE — 99204 OFFICE O/P NEW MOD 45 MIN: CPT | Performed by: INTERNAL MEDICINE

## 2024-11-13 PROCEDURE — 1036F TOBACCO NON-USER: CPT | Performed by: INTERNAL MEDICINE

## 2024-11-13 RX ORDER — LEVOTHYROXINE SODIUM 137 UG/1
137 TABLET ORAL DAILY
Qty: 30 TABLET | Refills: 6 | Status: SHIPPED | OUTPATIENT
Start: 2024-11-13 | End: 2025-11-13

## 2024-11-13 ASSESSMENT — ENCOUNTER SYMPTOMS
DEPRESSION: 0
LOSS OF SENSATION IN FEET: 0
OCCASIONAL FEELINGS OF UNSTEADINESS: 0

## 2024-11-13 ASSESSMENT — PATIENT HEALTH QUESTIONNAIRE - PHQ9
SUM OF ALL RESPONSES TO PHQ9 QUESTIONS 1 & 2: 0
2. FEELING DOWN, DEPRESSED OR HOPELESS: NOT AT ALL
1. LITTLE INTEREST OR PLEASURE IN DOING THINGS: NOT AT ALL

## 2024-11-13 ASSESSMENT — PAIN SCALES - GENERAL: PAINLEVEL_OUTOF10: 4

## 2025-01-22 ENCOUNTER — LAB (OUTPATIENT)
Dept: LAB | Facility: LAB | Age: 80
End: 2025-01-22
Payer: MEDICARE

## 2025-01-22 DIAGNOSIS — E03.9 ACQUIRED HYPOTHYROIDISM: ICD-10-CM

## 2025-01-22 LAB
T3FREE SERPL-MCNC: 3.4 PG/ML (ref 2.3–4.2)
T4 FREE SERPL-MCNC: 1.62 NG/DL (ref 0.61–1.12)
TSH SERPL-ACNC: 0.14 MIU/L (ref 0.44–3.98)

## 2025-01-22 PROCEDURE — 84443 ASSAY THYROID STIM HORMONE: CPT

## 2025-01-22 PROCEDURE — 84439 ASSAY OF FREE THYROXINE: CPT

## 2025-01-22 PROCEDURE — 84481 FREE ASSAY (FT-3): CPT

## 2025-02-12 DIAGNOSIS — E11.9 TYPE 2 DIABETES MELLITUS WITHOUT COMPLICATION, WITHOUT LONG-TERM CURRENT USE OF INSULIN (MULTI): ICD-10-CM

## 2025-02-12 RX ORDER — METFORMIN HYDROCHLORIDE 500 MG/1
TABLET, EXTENDED RELEASE ORAL
Qty: 90 TABLET | Refills: 1 | Status: SHIPPED | OUTPATIENT
Start: 2025-02-12

## 2025-03-21 NOTE — PROGRESS NOTES
HPI   79 yo presents for thyroid evaluation.  Pt with IFG, htn, dyslipidemia, hiatal hernia, pulmonary nodules.     Thyroid:  -pt with partial thyroidectomy 1983 for nodular thyroid  -pt had been on as much as 200 mcg synthroid, doses have been lowered over the years  -when dosage started to go under 150 mcg pt noticed changes in her sx  -from 2016 on pt stopped working 2 jobs, wt has increased, pt not feeling as well        -currently on 125 mcg levothyroxine and feeling tired/not able to lose wt/cold     8/24 tsh-3.21, 6/24-0.34, 5/24-0.07 (pt unclear what dose she was on when this was taken)  -pt relates having muscle cramps/more fatigue when on 100mcg dosage in the past  -pt was hyperthyroid on 137 mcg    Since last visit:  -increased from 125 to 137mcg roma synthroid roma  Jan 2025 tsh-0.14/ft4-1.62/ft3-3.9  -pt's energy/wt/cold      Current Outpatient Medications:     dupilumab (Dupixent Pen) 300 mg/2 mL injection, Inject under the skin., Disp: , Rfl:     metFORMIN  mg 24 hr tablet, TAKE 1 TABLET (500 MG) BY MOUTH ONCE DAILY WITH BREAKFAST. DO NOT CRUSH, CHEW, OR SPLIT., Disp: 90 tablet, Rfl: 1    Synthroid 137 mcg tablet, Take 1 tablet (137 mcg) by mouth early in the morning.., Disp: 30 tablet, Rfl: 6    latanoprost (Xalatan) 0.005 % ophthalmic solution, Administer 1 drop into both eyes once daily at bedtime. (Patient not taking: Reported on 3/24/2025), Disp: , Rfl:     tiZANidine (Zanaflex) 4 mg tablet, Take 1 tablet (4 mg) by mouth every 8 hours if needed for muscle spasms (do not take and drive) for up to 10 days., Disp: 30 tablet, Rfl: 0      Allergies as of 03/24/2025 - Reviewed 03/24/2025   Allergen Reaction Noted    Adhesive tape-silicones Unknown 09/25/2023    Ciprofloxacin Hives and Unknown 09/25/2023    Metoprolol Unknown 09/25/2023    Rosuvastatin Unknown 09/25/2023    Statins-hmg-coa reductase inhibitors Other 09/25/2023    Sulfa (sulfonamide antibiotics) Hives, Itching, and Unknown 09/25/2023  "   Adhesive Rash 09/25/2023    Lanolin alcohols Itching 09/25/2023    Phisoderm Unknown and Itching 09/25/2023         Review of Systems   Cardiology: Lightheadedness-denies.  Chest pain-denies.  Leg edema-denies.  Palpitations-denies.  Respiratory: Cough-denies. Shortness of breath-denies.  Wheezing-denies.  Gastroenterology: Constipation-denies.  Diarrhea-denies.  Heartburn-denies.  Endocrinology: Cold intolerance-denies.  Heat intolerance-denies.  Sweats-denies.  Neurology: Headache-denies.  Tremor-denies.  Neuropathy in extremities-denies.  Psychology: Low energy-denies.  Irritability-denies.  Sleep disturbances-denies.      /73   Pulse 78   Ht 1.524 m (5')   Wt 83.9 kg (185 lb)   BMI 36.13 kg/m²       Labs:  Lab Results   Component Value Date    WBC 10.3 05/02/2024    NRBC 0.0 05/02/2024    RBC 4.79 05/02/2024    HGB 14.4 05/02/2024    HCT 44.2 05/02/2024     05/02/2024     Lab Results   Component Value Date    CALCIUM 9.4 06/24/2024    AST 13 05/02/2024    ALKPHOS 93 05/02/2024    BILITOT 0.7 05/02/2024    PROT 7.1 05/02/2024    ALBUMIN 4.3 05/02/2024    GLOB 3.0 06/30/2023    AGR 1.3 (L) 06/30/2023     06/24/2024    K 4.3 06/24/2024     06/24/2024    CO2 20 (L) 06/24/2024    ANIONGAP 19 06/24/2024    BUN 11 06/24/2024    CREATININE 0.80 06/24/2024    UREACREAUR 13.6 06/30/2023    GLUCOSE 135 (H) 06/24/2024    ALT 10 05/02/2024    EGFR 76 06/24/2024     Lab Results   Component Value Date    CHOL 220 (H) 05/02/2024    TRIG 117 05/02/2024    HDL 58.0 05/02/2024    LDLCALC 139 (H) 05/02/2024     No results found for: \"MICROALBCREA\"  Lab Results   Component Value Date    TSH 0.14 (L) 01/22/2025     Lab Results   Component Value Date    CZIYIHXB40 386 03/02/2023     Lab Results   Component Value Date    HGBA1C 6.9 (H) 08/06/2024         Physical Exam   General Appearance: pleasant, cooperative, no acute distress  HEENT: no chemosis, no proptosis, no lid lag, no lid retraction  Neck: no " lymphadenopathy, no thyromegaly, no dominant thyroid nodules  Heart: no murmurs, regular rate and rhythm, S1 and S2  Lungs: no wheezes, no rhonci, no rales  Extremities: no lower extremity swelling      Assessment/Plan   1. Acquired hypothyroidism (Primary)  -after dose increase from 125 to 137mcg pt feels no different but her labs reveal she is borderline hyperthyroid on this dosage  -not worth potential downsides of this dosage is no improvement in sx, go back to 125mcg dosage and follow yearly with pcp  -if feels dramatically worse going back to 125mcg call office for 137mcg dosage and can follow with endo yearly in that case    -clearly the thyroid is not driving her sx as she feels no better being slightly hyperthyroid  -no utility in trial of armour thyroid based on this  -would work on regular physical activity/caloric restriction/taking care of sleep and mental health             Follow Up:  Prn with endo    -labs/tests/notes reviewed  -reviewed and counseled patient on medication monitoring and side effects

## 2025-03-24 ENCOUNTER — APPOINTMENT (OUTPATIENT)
Dept: ENDOCRINOLOGY | Facility: CLINIC | Age: 80
End: 2025-03-24
Payer: COMMERCIAL

## 2025-03-24 VITALS
DIASTOLIC BLOOD PRESSURE: 73 MMHG | SYSTOLIC BLOOD PRESSURE: 151 MMHG | WEIGHT: 185 LBS | HEIGHT: 60 IN | HEART RATE: 78 BPM | BODY MASS INDEX: 36.32 KG/M2

## 2025-03-24 DIAGNOSIS — E03.9 ACQUIRED HYPOTHYROIDISM: Primary | ICD-10-CM

## 2025-03-24 PROCEDURE — 1126F AMNT PAIN NOTED NONE PRSNT: CPT | Performed by: INTERNAL MEDICINE

## 2025-03-24 PROCEDURE — 99213 OFFICE O/P EST LOW 20 MIN: CPT | Performed by: INTERNAL MEDICINE

## 2025-03-24 PROCEDURE — 1159F MED LIST DOCD IN RCRD: CPT | Performed by: INTERNAL MEDICINE

## 2025-03-24 PROCEDURE — 3077F SYST BP >= 140 MM HG: CPT | Performed by: INTERNAL MEDICINE

## 2025-03-24 PROCEDURE — 3078F DIAST BP <80 MM HG: CPT | Performed by: INTERNAL MEDICINE

## 2025-03-24 PROCEDURE — 1036F TOBACCO NON-USER: CPT | Performed by: INTERNAL MEDICINE

## 2025-03-24 ASSESSMENT — LIFESTYLE VARIABLES
SKIP TO QUESTIONS 9-10: 1
AUDIT-C TOTAL SCORE: 0
HOW OFTEN DO YOU HAVE A DRINK CONTAINING ALCOHOL: NEVER
HOW OFTEN DO YOU HAVE SIX OR MORE DRINKS ON ONE OCCASION: NEVER
HOW MANY STANDARD DRINKS CONTAINING ALCOHOL DO YOU HAVE ON A TYPICAL DAY: PATIENT DOES NOT DRINK

## 2025-03-24 ASSESSMENT — ENCOUNTER SYMPTOMS
LOSS OF SENSATION IN FEET: 0
DEPRESSION: 0
OCCASIONAL FEELINGS OF UNSTEADINESS: 0

## 2025-03-24 ASSESSMENT — PAIN SCALES - GENERAL: PAINLEVEL_OUTOF10: 0-NO PAIN

## 2025-04-24 ENCOUNTER — TELEPHONE (OUTPATIENT)
Dept: PRIMARY CARE | Facility: CLINIC | Age: 80
End: 2025-04-24
Payer: MEDICARE

## 2025-04-24 DIAGNOSIS — E03.9 ACQUIRED HYPOTHYROIDISM: ICD-10-CM

## 2025-04-24 RX ORDER — LEVOTHYROXINE SODIUM 125 UG/1
125 TABLET ORAL DAILY
Qty: 90 TABLET | Refills: 1 | Status: SHIPPED | OUTPATIENT
Start: 2025-04-24 | End: 2026-04-24

## 2025-04-24 NOTE — TELEPHONE ENCOUNTER
Requesting a RX for Synthroid 125 mcg. She said Dr. Rosario took her off of the 137 mcg after her last labs. [facility] in [place].

## 2025-05-05 ENCOUNTER — OFFICE VISIT (OUTPATIENT)
Dept: PRIMARY CARE | Facility: CLINIC | Age: 80
End: 2025-05-05
Payer: MEDICARE

## 2025-05-05 VITALS
HEART RATE: 74 BPM | OXYGEN SATURATION: 98 % | HEIGHT: 60 IN | DIASTOLIC BLOOD PRESSURE: 80 MMHG | BODY MASS INDEX: 36.91 KG/M2 | WEIGHT: 188 LBS | SYSTOLIC BLOOD PRESSURE: 124 MMHG

## 2025-05-05 DIAGNOSIS — R73.03 PREDIABETES: ICD-10-CM

## 2025-05-05 DIAGNOSIS — E78.2 MIXED HYPERLIPIDEMIA: ICD-10-CM

## 2025-05-05 DIAGNOSIS — N18.31 STAGE 3A CHRONIC KIDNEY DISEASE (MULTI): ICD-10-CM

## 2025-05-05 DIAGNOSIS — N95.1 MENOPAUSAL SYNDROME: ICD-10-CM

## 2025-05-05 DIAGNOSIS — R53.83 FATIGUE, UNSPECIFIED TYPE: ICD-10-CM

## 2025-05-05 DIAGNOSIS — Z00.00 MEDICARE ANNUAL WELLNESS VISIT, SUBSEQUENT: Primary | ICD-10-CM

## 2025-05-05 DIAGNOSIS — H93.13 BILATERAL TINNITUS: ICD-10-CM

## 2025-05-05 DIAGNOSIS — N95.0 POST-MENOPAUSAL BLEEDING: ICD-10-CM

## 2025-05-05 DIAGNOSIS — Z78.0 MENOPAUSE: ICD-10-CM

## 2025-05-05 DIAGNOSIS — R35.1 NOCTURIA: ICD-10-CM

## 2025-05-05 DIAGNOSIS — E03.9 ACQUIRED HYPOTHYROIDISM: ICD-10-CM

## 2025-05-05 DIAGNOSIS — Z80.49 FAMILY HISTORY OF MALIGNANT NEOPLASM OF ENDOMETRIUM: ICD-10-CM

## 2025-05-05 DIAGNOSIS — Z12.11 ENCOUNTER FOR SCREENING FOR MALIGNANT NEOPLASM OF COLON: ICD-10-CM

## 2025-05-05 DIAGNOSIS — E55.9 VITAMIN D DEFICIENCY: ICD-10-CM

## 2025-05-05 DIAGNOSIS — H91.93 BILATERAL HEARING LOSS, UNSPECIFIED HEARING LOSS TYPE: ICD-10-CM

## 2025-05-05 DIAGNOSIS — E66.01 OBESITY, MORBID (MULTI): ICD-10-CM

## 2025-05-05 DIAGNOSIS — I10 PRIMARY HYPERTENSION: ICD-10-CM

## 2025-05-05 DIAGNOSIS — D32.0 BENIGN NEOPLASM OF CEREBRAL MENINGES (MULTI): ICD-10-CM

## 2025-05-05 PROCEDURE — 99397 PER PM REEVAL EST PAT 65+ YR: CPT | Performed by: FAMILY MEDICINE

## 2025-05-05 PROCEDURE — 99215 OFFICE O/P EST HI 40 MIN: CPT | Mod: 25 | Performed by: FAMILY MEDICINE

## 2025-05-05 PROCEDURE — 99214 OFFICE O/P EST MOD 30 MIN: CPT | Performed by: FAMILY MEDICINE

## 2025-05-05 ASSESSMENT — COGNITIVE AND FUNCTIONAL STATUS - GENERAL: VERBAL FLUENCY - ANIMAL NAMES (0 TO 25): 3

## 2025-05-05 ASSESSMENT — ACTIVITIES OF DAILY LIVING (ADL)
BATHING: INDEPENDENT
TAKING MEDICATION: INDEPENDENT
FEEDING: INDEPENDENT
PILL BOX USED: YES
TOILETING: INDEPENDENT
USING TRANSPORTATION: INDEPENDENT
USING TELEPHONE: INDEPENDENT
DRESSING: INDEPENDENT
PREPARING MEALS: INDEPENDENT
MANAGING FINANCES: INDEPENDENT
GROCERY SHOPPING: INDEPENDENT
NEEDS ASSISTANCE WITH FOOD: INDEPENDENT
JUDGMENT_ADEQUATE_SAFELY_COMPLETE_DAILY_ACTIVITIES: YES
EATING: INDEPENDENT
DOING HOUSEWORK: INDEPENDENT
STIL DRIVING: YES
ADEQUATE_TO_COMPLETE_ADL: YES

## 2025-05-05 ASSESSMENT — GERIATRIC MINI NUTRITIONAL ASSESSMENT (MNA)
E NEUROPSYCHOLOGICAL PROBLEMS: NO PSYCHOLOGICAL PROBLEMS
B WEIGHT LOSS DURING THE LAST 3 MONTHS: DOES NOT KNOW
C GENERAL MOBILITY: GOES OUT
A HAS FOOD INTAKE DECLINED OVER THE PAST 3 MONTHS DUE TO LOSS OF APPETITE, DIGESTIVE PROBLEMS, CHEWING OR SWALLOWING DIFFICULTIES?: NO DECREASE IN FOOD INTAKE
D HAS SUFFERED PSYCHOLOGICAL STRESS OR ACUTE DISEASE IN THE PAST 3 MONTHS?: NO

## 2025-05-05 ASSESSMENT — PATIENT HEALTH QUESTIONNAIRE - PHQ9
2. FEELING DOWN, DEPRESSED OR HOPELESS: NOT AT ALL
1. LITTLE INTEREST OR PLEASURE IN DOING THINGS: NOT AT ALL
SUM OF ALL RESPONSES TO PHQ9 QUESTIONS 1 AND 2: 0

## 2025-05-05 ASSESSMENT — PAIN SCALES - GENERAL: PAINLEVEL_OUTOF10: 0-NO PAIN

## 2025-05-05 NOTE — PROGRESS NOTES
Subjective   Patient ID: Erica Smith is a 80 y.o. female who presents for Annual Exam.    Mini Cognitive Screening:          Three Word Recall             Words:  Book, apple, window.            Patient able to repeat?  Yes .         Three Word Recall after 5mins             Word recall Score (0-3):  3 .         Clock Drawing             Given preprinted [place] and instructions?  Yes .            Clock Draw Score (0 or 2):  2 .         Clock Draw plus Word Recall Score             Mini Cog Result <3 Pos:  5 .         Follow-up:             .  Not needed, negative result .     General Comments:          Patient here for Medicare Wellness Exam.         Active medical problems:         PMH/PSH/FMH/SHX: reviewed, noted below.         Other providers:         Medications: reviewed, noted below.         Depression screening: denies feeling down, depressed, hopeless. Denies having felt little interest or pleasure in doing things.         Functional ability and safety: Get up and go test is <30s. Pt denies any issues with ADLs, including phone, transportation, shopping, preparing meals, housework,laundry, medications or managing money. No home safety concerns, including having rugs in the hallway, lack grab bars in the bathroom, lack handrails, on the stairs or have poor lighting. Pt denies falls.         Cognitive evaluation: MMSE         Hearing changes: Pt denies changes or concerns.         Advanced directives: discussed and recommended.          Preventative services: sheet reviewed, scanned, copy provided to patient.     Hyperlipidemia:          Patient here for F/U. currently trying to control with therapeutic lifestyle changes, she is unable to take statins, she was found to have non specific changes on her MRI - no CVA noted, but she does have microvascular disease.          Labs ordered today.          she does not tolerate statins - has tried ezetimide.     Hypertension:          Patient here for F/U. at  goal.          Med compliance yes.          Med side effects none.      Gastroenterology:          she has a small hiatal hernia in on her CT chest - she states it is stable, no symptoms.     -Pulmonology:          she has a smoking history with stable pulmonary nodules.     Hypothyroidism:          Follow Up her thyroid was overtreated at 137 mcg a day. When she dropped down to 100 she felt muscle cramps and fatigue, she is now on 125 mcg and will check her labs in a couple of weeks - but with side effects.          Hypothyroidism tolerating meds with no side effects- feels tired on lower doses.      General Endocrinology:          she has mildly elevated sugars - she has not been taking her metformin - she has been trying to follow a healthy diet, she has good and bad days with her diet     Medications reviewed:          Current medications Use reviewed and recorded.          Vitamin/Mineral supplements Use reviewed and recorded.   Her colonoscopy is due - referral to Dr Mills, mammogram ordered - labs ordered, prevnar 20 given     Review family history:          Reviewed See family history.      Review past history:          Reviewed See past history.      Review surgical history:          Reviewed See surgical history  Postmenopausal spotting - pelvic US ordered         Review of Systems   Constitutional:  Positive for fatigue. Negative for chills and fever.   HENT:  Negative for ear pain and postnasal drip.    Respiratory:  Negative for shortness of breath and wheezing.    Cardiovascular:  Negative for chest pain, palpitations and leg swelling.   Gastrointestinal:  Negative for constipation, nausea and vomiting.   Genitourinary:  Negative for frequency and hematuria.   Musculoskeletal:  Positive for arthralgias and myalgias.   Skin:  Negative for rash.   Neurological:  Positive for headaches. Negative for dizziness and weakness.   Psychiatric/Behavioral:  Negative for confusion. The patient is not  nervous/anxious.        Objective   /80   Pulse 74   Ht 1.524 m (5')   Wt 85.3 kg (188 lb)   SpO2 98%   BMI 36.72 kg/m²     Physical Exam  Constitutional:       General: She is not in acute distress.     Appearance: Normal appearance. She is not ill-appearing.   HENT:      Head: Normocephalic.      Right Ear: Tympanic membrane and external ear normal.      Left Ear: Tympanic membrane and external ear normal.      Nose: Nose normal. No congestion.      Mouth/Throat:      Mouth: Mucous membranes are moist.      Pharynx: No posterior oropharyngeal erythema.   Eyes:      Extraocular Movements: Extraocular movements intact.      Conjunctiva/sclera: Conjunctivae normal.      Pupils: Pupils are equal, round, and reactive to light.   Cardiovascular:      Rate and Rhythm: Normal rate and regular rhythm.      Pulses: Normal pulses.      Heart sounds: Normal heart sounds. No murmur heard.  Pulmonary:      Effort: Pulmonary effort is normal. No respiratory distress.      Breath sounds: Normal breath sounds. No wheezing.   Chest:   Breasts:     Right: Normal. No mass, nipple discharge, skin change or tenderness.      Left: Normal. No mass, nipple discharge, skin change or tenderness.   Abdominal:      General: Bowel sounds are normal.      Palpations: Abdomen is soft. There is no mass.      Tenderness: There is no abdominal tenderness.      Hernia: No hernia is present.   Genitourinary:     Comments: Declines - agrees to a pelvic US  Musculoskeletal:         General: Normal range of motion.      Cervical back: Neck supple. No tenderness.      Right lower leg: No edema.      Left lower leg: No edema.   Lymphadenopathy:      Cervical: No cervical adenopathy.   Skin:     General: Skin is warm.      Findings: No rash.   Neurological:      General: No focal deficit present.      Mental Status: She is alert and oriented to person, place, and time.      Gait: Gait normal.   Psychiatric:         Mood and Affect: Mood normal.          Behavior: Behavior normal.         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    Relevant Orders    Referral to Neurology    Bilateral tinnitus H93.13    Relevant Orders    Referral to Neurology    Hypertension I10    Relevant Orders    TSH with reflex to Free T4 if abnormal    Hypothyroid E03.9    Relevant Orders    Triiodothyronine, Free    T4, free    Hearing loss H91.90    Hyperlipidemia E78.5    Relevant Orders    Comprehensive Metabolic Panel    Lipid Panel    Menopausal syndrome N95.1    Post-menopausal bleeding N95.0    Relevant Orders    US PELVIS TRANSABDOMINAL WITH TRANSVAGINAL    Prediabetes R73.03    Relevant Orders    Hemoglobin A1C    Vitamin D deficiency E55.9    Relevant Orders    Vitamin D 25-Hydroxy,Total (for eval of Vitamin D levels)    Obesity, morbid (Multi) E66.01    Stage 3a chronic kidney disease (Multi) N18.31     Other Visit Diagnoses         Codes      Medicare annual wellness visit, subsequent    -  Primary Z00.00    UTD on prevnar 20       Family history of malignant neoplasm of endometrium     Z80.49    check pelvic US     Relevant Orders    US PELVIS TRANSABDOMINAL WITH TRANSVAGINAL      Encounter for screening for malignant neoplasm of colon     Z12.11    referral to GI - patient aware to see Dr Mills       Fatigue, unspecified type     R53.83    Relevant Orders    CBC and Auto Differential      Nocturia     R35.1    Relevant Orders    Urinalysis with Reflex Culture and Microscopic      Menopause     Z78.0    Relevant Orders    XR DEXA bone density

## 2025-05-09 PROBLEM — R91.1 PULMONARY NODULE: Status: RESOLVED | Noted: 2023-09-25 | Resolved: 2025-05-09

## 2025-05-09 ASSESSMENT — ENCOUNTER SYMPTOMS
FEVER: 0
NERVOUS/ANXIOUS: 0
CONSTIPATION: 0
MYALGIAS: 1
PALPITATIONS: 0
SHORTNESS OF BREATH: 0
CONFUSION: 0
VOMITING: 0
NAUSEA: 0
HEMATURIA: 0
ARTHRALGIAS: 1
HEADACHES: 1
DIZZINESS: 0
WHEEZING: 0
WEAKNESS: 0
CHILLS: 0
FATIGUE: 1
FREQUENCY: 0

## 2025-05-16 ENCOUNTER — APPOINTMENT (OUTPATIENT)
Dept: RADIOLOGY | Facility: CLINIC | Age: 80
End: 2025-05-16
Payer: MEDICARE

## 2025-05-16 ENCOUNTER — HOSPITAL ENCOUNTER (OUTPATIENT)
Dept: RADIOLOGY | Facility: HOSPITAL | Age: 80
Discharge: HOME | End: 2025-05-16
Payer: MEDICARE

## 2025-05-16 DIAGNOSIS — N95.0 POST-MENOPAUSAL BLEEDING: ICD-10-CM

## 2025-05-16 DIAGNOSIS — Z80.49 FAMILY HISTORY OF MALIGNANT NEOPLASM OF ENDOMETRIUM: ICD-10-CM

## 2025-05-16 DIAGNOSIS — Z78.0 MENOPAUSE: ICD-10-CM

## 2025-05-16 PROCEDURE — 77080 DXA BONE DENSITY AXIAL: CPT

## 2025-05-16 PROCEDURE — 76830 TRANSVAGINAL US NON-OB: CPT

## 2025-05-20 ENCOUNTER — TELEPHONE (OUTPATIENT)
Dept: PRIMARY CARE | Facility: CLINIC | Age: 80
End: 2025-05-20
Payer: MEDICARE

## 2025-05-20 NOTE — TELEPHONE ENCOUNTER
----- Message from Carmelo Jensen sent at 5/18/2025  6:21 PM EDT -----    ----- Message -----  From: Interface, Radiology Results In  Sent: 5/16/2025   3:33 PM EDT  To: Carmelo Jensen MD

## 2025-05-20 NOTE — TELEPHONE ENCOUNTER
Appointment scheduled for 6/2 to discuss results.   Claudine Johansen is a 18 Alondra Heart MD

## 2025-05-21 LAB
25(OH)D3+25(OH)D2 SERPL-MCNC: 22 NG/ML (ref 30–100)
ALBUMIN SERPL-MCNC: 4 G/DL (ref 3.6–5.1)
ALP SERPL-CCNC: 67 U/L (ref 37–153)
ALT SERPL-CCNC: 11 U/L (ref 6–29)
ANION GAP SERPL CALCULATED.4IONS-SCNC: 9 MMOL/L (CALC) (ref 7–17)
APPEARANCE UR: CLEAR
AST SERPL-CCNC: 13 U/L (ref 10–35)
BACTERIA #/AREA URNS HPF: NORMAL /HPF
BACTERIA UR CULT: NORMAL
BASOPHILS # BLD AUTO: 118 CELLS/UL (ref 0–200)
BASOPHILS NFR BLD AUTO: 1.6 %
BILIRUB SERPL-MCNC: 0.6 MG/DL (ref 0.2–1.2)
BILIRUB UR QL STRIP: NEGATIVE
BUN SERPL-MCNC: 16 MG/DL (ref 7–25)
CALCIUM SERPL-MCNC: 9.2 MG/DL (ref 8.6–10.4)
CHLORIDE SERPL-SCNC: 106 MMOL/L (ref 98–110)
CHOLEST SERPL-MCNC: 238 MG/DL
CHOLEST/HDLC SERPL: 4 (CALC)
CO2 SERPL-SCNC: 25 MMOL/L (ref 20–32)
COLOR UR: YELLOW
CREAT SERPL-MCNC: 0.7 MG/DL (ref 0.6–1)
EGFRCR SERPLBLD CKD-EPI 2021: 88 ML/MIN/1.73M2
EOSINOPHIL # BLD AUTO: 178 CELLS/UL (ref 15–500)
EOSINOPHIL NFR BLD AUTO: 2.4 %
ERYTHROCYTE [DISTWIDTH] IN BLOOD BY AUTOMATED COUNT: 13.3 % (ref 11–15)
EST. AVERAGE GLUCOSE BLD GHB EST-MCNC: 140 MG/DL
EST. AVERAGE GLUCOSE BLD GHB EST-SCNC: 7.7 MMOL/L
GLUCOSE SERPL-MCNC: 103 MG/DL (ref 65–99)
GLUCOSE UR QL STRIP: NEGATIVE
HBA1C MFR BLD: 6.5 %
HCT VFR BLD AUTO: 40.6 % (ref 35–45)
HDLC SERPL-MCNC: 60 MG/DL
HGB BLD-MCNC: 13.8 G/DL (ref 11.7–15.5)
HGB UR QL STRIP: NEGATIVE
HYALINE CASTS #/AREA URNS LPF: NORMAL /LPF
KETONES UR QL STRIP: NEGATIVE
LDLC SERPL CALC-MCNC: 156 MG/DL (CALC)
LEUKOCYTE ESTERASE UR QL STRIP: NEGATIVE
LYMPHOCYTES # BLD AUTO: 1487 CELLS/UL (ref 850–3900)
LYMPHOCYTES NFR BLD AUTO: 20.1 %
MCH RBC QN AUTO: 30.7 PG (ref 27–33)
MCHC RBC AUTO-ENTMCNC: 34 G/DL (ref 32–36)
MCV RBC AUTO: 90.4 FL (ref 80–100)
MONOCYTES # BLD AUTO: 562 CELLS/UL (ref 200–950)
MONOCYTES NFR BLD AUTO: 7.6 %
NEUTROPHILS # BLD AUTO: 5054 CELLS/UL (ref 1500–7800)
NEUTROPHILS NFR BLD AUTO: 68.3 %
NITRITE UR QL STRIP: NEGATIVE
NONHDLC SERPL-MCNC: 178 MG/DL (CALC)
PH UR STRIP: 6 [PH] (ref 5–8)
PLATELET # BLD AUTO: 318 THOUSAND/UL (ref 140–400)
PMV BLD REES-ECKER: 9.4 FL (ref 7.5–12.5)
POTASSIUM SERPL-SCNC: 4.4 MMOL/L (ref 3.5–5.3)
PROT SERPL-MCNC: 6.6 G/DL (ref 6.1–8.1)
PROT UR QL STRIP: NEGATIVE
RBC # BLD AUTO: 4.49 MILLION/UL (ref 3.8–5.1)
RBC #/AREA URNS HPF: NORMAL /HPF
SERVICE CMNT-IMP: NORMAL
SODIUM SERPL-SCNC: 140 MMOL/L (ref 135–146)
SP GR UR STRIP: 1.01 (ref 1–1.03)
SQUAMOUS #/AREA URNS HPF: NORMAL /HPF
T3FREE SERPL-MCNC: 2.8 PG/ML (ref 2.3–4.2)
T4 FREE SERPL-MCNC: 1.5 NG/DL (ref 0.8–1.8)
TRIGL SERPL-MCNC: 106 MG/DL
TSH SERPL-ACNC: 1.22 MIU/L (ref 0.4–4.5)
WBC # BLD AUTO: 7.4 THOUSAND/UL (ref 3.8–10.8)
WBC #/AREA URNS HPF: NORMAL /HPF

## 2025-05-22 ENCOUNTER — TELEPHONE (OUTPATIENT)
Dept: PRIMARY CARE | Facility: CLINIC | Age: 80
End: 2025-05-22
Payer: MEDICARE

## 2025-05-22 NOTE — TELEPHONE ENCOUNTER
Patient has an appointment to discuss lab results on 6/2. OK to wait until then to discuss or do you want us to call her about them?

## 2025-05-27 ENCOUNTER — APPOINTMENT (OUTPATIENT)
Dept: RADIOLOGY | Facility: HOSPITAL | Age: 80
End: 2025-05-27
Payer: MEDICARE

## 2025-05-30 ENCOUNTER — HOSPITAL ENCOUNTER (OUTPATIENT)
Dept: RADIOLOGY | Facility: HOSPITAL | Age: 80
Discharge: HOME | End: 2025-05-30
Payer: MEDICARE

## 2025-05-30 VITALS — HEIGHT: 60 IN | WEIGHT: 188 LBS | BODY MASS INDEX: 36.91 KG/M2

## 2025-05-30 DIAGNOSIS — Z12.31 SCREENING MAMMOGRAM FOR BREAST CANCER: ICD-10-CM

## 2025-05-30 PROCEDURE — 77067 SCR MAMMO BI INCL CAD: CPT

## 2025-05-31 DIAGNOSIS — Z12.31 SCREENING MAMMOGRAM FOR BREAST CANCER: Primary | ICD-10-CM

## 2025-06-02 ENCOUNTER — OFFICE VISIT (OUTPATIENT)
Dept: PRIMARY CARE | Facility: CLINIC | Age: 80
End: 2025-06-02
Payer: MEDICARE

## 2025-06-02 VITALS
OXYGEN SATURATION: 98 % | DIASTOLIC BLOOD PRESSURE: 68 MMHG | BODY MASS INDEX: 36.52 KG/M2 | HEIGHT: 60 IN | WEIGHT: 186 LBS | HEART RATE: 86 BPM | SYSTOLIC BLOOD PRESSURE: 114 MMHG

## 2025-06-02 DIAGNOSIS — M85.80 OSTEOPENIA, UNSPECIFIED LOCATION: Primary | ICD-10-CM

## 2025-06-02 DIAGNOSIS — E53.8 VITAMIN B12 DEFICIENCY: ICD-10-CM

## 2025-06-02 DIAGNOSIS — R79.0 LOW MAGNESIUM LEVEL: ICD-10-CM

## 2025-06-02 DIAGNOSIS — E55.9 VITAMIN D DEFICIENCY: ICD-10-CM

## 2025-06-02 DIAGNOSIS — E78.2 MIXED HYPERLIPIDEMIA: ICD-10-CM

## 2025-06-02 PROCEDURE — 1160F RVW MEDS BY RX/DR IN RCRD: CPT | Performed by: FAMILY MEDICINE

## 2025-06-02 PROCEDURE — 3074F SYST BP LT 130 MM HG: CPT | Performed by: FAMILY MEDICINE

## 2025-06-02 PROCEDURE — 1159F MED LIST DOCD IN RCRD: CPT | Performed by: FAMILY MEDICINE

## 2025-06-02 PROCEDURE — 99214 OFFICE O/P EST MOD 30 MIN: CPT | Performed by: FAMILY MEDICINE

## 2025-06-02 PROCEDURE — G2211 COMPLEX E/M VISIT ADD ON: HCPCS | Performed by: FAMILY MEDICINE

## 2025-06-02 PROCEDURE — 1126F AMNT PAIN NOTED NONE PRSNT: CPT | Performed by: FAMILY MEDICINE

## 2025-06-02 PROCEDURE — 1036F TOBACCO NON-USER: CPT | Performed by: FAMILY MEDICINE

## 2025-06-02 PROCEDURE — 3078F DIAST BP <80 MM HG: CPT | Performed by: FAMILY MEDICINE

## 2025-06-02 ASSESSMENT — ENCOUNTER SYMPTOMS
BACK PAIN: 1
CONSTIPATION: 0
PALPITATIONS: 0
MYALGIAS: 1
ARTHRALGIAS: 1
WHEEZING: 0
DIZZINESS: 0
FEVER: 0
SHORTNESS OF BREATH: 0
CHILLS: 0
NAUSEA: 0
VOMITING: 0
CONFUSION: 0
WEAKNESS: 0
NERVOUS/ANXIOUS: 0
FATIGUE: 1

## 2025-06-02 ASSESSMENT — PAIN SCALES - GENERAL: PAINLEVEL_OUTOF10: 0-NO PAIN

## 2025-06-02 NOTE — PROGRESS NOTES
Subjective   Patient ID: Erica Smith is a 80 y.o. female who presents for Follow-up (Labs, bone density).    Here today for follow-up for her bone density.  She has normal density of her spine, but has been -2.3 of the neck of her hip.  She has a chronic history of vitamin D deficiency, admittedly she does not take her vitamin D supplement routinely.  She has not been following any weightbearing exercises and again admits to drinking caffeine most days.  We did have a long discussion regarding lifestyle modifications.  Recommend she take vitamin D3 5000 a day, eat calcium in her diet, add weightbearing exercises most days and avoid caffeine.  She wanted to work on all of these changes and did not want to add a medication like Evista.    Patient with hyperlipidemia wants to try diet and exercise and recheck in 3 months agrees to recheck labs at that time    Due for vitamin D check, vitamin B12 and magnesium.         Review of Systems   Constitutional:  Positive for fatigue. Negative for chills and fever.   Respiratory:  Negative for shortness of breath and wheezing.    Cardiovascular:  Negative for chest pain, palpitations and leg swelling.   Gastrointestinal:  Negative for constipation, nausea and vomiting.   Musculoskeletal:  Positive for arthralgias, back pain and myalgias.   Skin:  Negative for rash.   Neurological:  Negative for dizziness and weakness.   Psychiatric/Behavioral:  Negative for confusion. The patient is not nervous/anxious.    Vit D def, vit B12 def    Objective   /68   Pulse 86   Ht 1.524 m (5')   Wt 84.4 kg (186 lb)   SpO2 98%   BMI 36.33 kg/m²     Physical Exam  Vitals reviewed.   Constitutional:       General: She is not in acute distress.     Appearance: Normal appearance. She is not ill-appearing.   Cardiovascular:      Rate and Rhythm: Normal rate and regular rhythm.      Heart sounds: Normal heart sounds. No murmur heard.  Pulmonary:      Breath sounds: Normal breath sounds.    Neurological:      Mental Status: She is alert.         Assessment/Plan   Problem List Items Addressed This Visit           ICD-10-CM    Hyperlipidemia E78.5    Relevant Orders    Lipid Panel    Vitamin D deficiency E55.9    Relevant Orders    Vitamin D 25-Hydroxy,Total (for eval of Vitamin D levels)     Other Visit Diagnoses         Codes      Osteopenia, unspecified location    -  Primary M85.80    vit D supplement, dietary calcium, weight bearing exercise and avoif caffeine       Low magnesium level     R79.0    Relevant Orders    Magnesium      Vitamin B12 deficiency     E53.8    Relevant Orders    Vitamin B12

## 2025-07-21 ENCOUNTER — TELEPHONE (OUTPATIENT)
Dept: PRIMARY CARE | Facility: CLINIC | Age: 80
End: 2025-07-21
Payer: MEDICARE

## 2025-07-21 DIAGNOSIS — K21.9 GASTROESOPHAGEAL REFLUX DISEASE WITHOUT ESOPHAGITIS: Primary | ICD-10-CM

## 2025-07-21 RX ORDER — OMEPRAZOLE 40 MG/1
40 CAPSULE, DELAYED RELEASE ORAL
Qty: 30 CAPSULE | Refills: 1 | Status: SHIPPED | OUTPATIENT
Start: 2025-07-21 | End: 2025-08-20

## 2025-07-21 NOTE — TELEPHONE ENCOUNTER
Pt requesting Omeprazole 40mg medication for gerd flare-up  - she states she took this medication in the past (around 2022) - Do not see on med list

## 2025-07-28 ENCOUNTER — OFFICE VISIT (OUTPATIENT)
Dept: PRIMARY CARE | Facility: CLINIC | Age: 80
End: 2025-07-28
Payer: MEDICARE

## 2025-07-28 ENCOUNTER — HOSPITAL ENCOUNTER (OUTPATIENT)
Dept: RADIOLOGY | Facility: HOSPITAL | Age: 80
Discharge: HOME | End: 2025-07-28
Payer: MEDICARE

## 2025-07-28 VITALS
OXYGEN SATURATION: 97 % | HEART RATE: 83 BPM | SYSTOLIC BLOOD PRESSURE: 138 MMHG | DIASTOLIC BLOOD PRESSURE: 78 MMHG | WEIGHT: 177 LBS | HEIGHT: 60 IN | BODY MASS INDEX: 34.75 KG/M2

## 2025-07-28 DIAGNOSIS — K21.9 GASTROESOPHAGEAL REFLUX DISEASE WITHOUT ESOPHAGITIS: ICD-10-CM

## 2025-07-28 DIAGNOSIS — R63.4 WEIGHT LOSS: ICD-10-CM

## 2025-07-28 DIAGNOSIS — R63.0 DECREASED APPETITE: ICD-10-CM

## 2025-07-28 DIAGNOSIS — E03.9 ACQUIRED HYPOTHYROIDISM: ICD-10-CM

## 2025-07-28 DIAGNOSIS — R10.32 LEFT LOWER QUADRANT ABDOMINAL PAIN: ICD-10-CM

## 2025-07-28 DIAGNOSIS — R53.83 FATIGUE, UNSPECIFIED TYPE: ICD-10-CM

## 2025-07-28 DIAGNOSIS — R14.0 ABDOMINAL BLOATING: ICD-10-CM

## 2025-07-28 DIAGNOSIS — R19.7 DIARRHEA, UNSPECIFIED TYPE: ICD-10-CM

## 2025-07-28 DIAGNOSIS — R73.03 PREDIABETES: ICD-10-CM

## 2025-07-28 DIAGNOSIS — R14.0 ABDOMINAL DISTENSION (GASEOUS): Primary | ICD-10-CM

## 2025-07-28 DIAGNOSIS — E78.2 MIXED HYPERLIPIDEMIA: ICD-10-CM

## 2025-07-28 DIAGNOSIS — R14.0 ABDOMINAL BLOATING: Primary | ICD-10-CM

## 2025-07-28 DIAGNOSIS — E53.8 VITAMIN B12 DEFICIENCY: ICD-10-CM

## 2025-07-28 DIAGNOSIS — E55.9 VITAMIN D DEFICIENCY: ICD-10-CM

## 2025-07-28 DIAGNOSIS — N18.31 STAGE 3A CHRONIC KIDNEY DISEASE (MULTI): ICD-10-CM

## 2025-07-28 DIAGNOSIS — R79.0 LOW MAGNESIUM LEVEL: ICD-10-CM

## 2025-07-28 DIAGNOSIS — R35.1 NOCTURIA: ICD-10-CM

## 2025-07-28 LAB
CREAT SERPL-MCNC: 0.81 MG/DL (ref 0.5–1.05)
EGFRCR SERPLBLD CKD-EPI 2021: 74 ML/MIN/1.73M*2

## 2025-07-28 PROCEDURE — G2211 COMPLEX E/M VISIT ADD ON: HCPCS | Performed by: FAMILY MEDICINE

## 2025-07-28 PROCEDURE — 1159F MED LIST DOCD IN RCRD: CPT | Performed by: FAMILY MEDICINE

## 2025-07-28 PROCEDURE — 82565 ASSAY OF CREATININE: CPT | Performed by: FAMILY MEDICINE

## 2025-07-28 PROCEDURE — 1160F RVW MEDS BY RX/DR IN RCRD: CPT | Performed by: FAMILY MEDICINE

## 2025-07-28 PROCEDURE — 74177 CT ABD & PELVIS W/CONTRAST: CPT | Performed by: RADIOLOGY

## 2025-07-28 PROCEDURE — 74177 CT ABD & PELVIS W/CONTRAST: CPT

## 2025-07-28 PROCEDURE — 2550000001 HC RX 255 CONTRASTS: Performed by: FAMILY MEDICINE

## 2025-07-28 PROCEDURE — 36415 COLL VENOUS BLD VENIPUNCTURE: CPT

## 2025-07-28 PROCEDURE — 1036F TOBACCO NON-USER: CPT | Performed by: FAMILY MEDICINE

## 2025-07-28 PROCEDURE — 3075F SYST BP GE 130 - 139MM HG: CPT | Performed by: FAMILY MEDICINE

## 2025-07-28 PROCEDURE — 1126F AMNT PAIN NOTED NONE PRSNT: CPT | Performed by: FAMILY MEDICINE

## 2025-07-28 PROCEDURE — 3078F DIAST BP <80 MM HG: CPT | Performed by: FAMILY MEDICINE

## 2025-07-28 PROCEDURE — 99214 OFFICE O/P EST MOD 30 MIN: CPT | Mod: 25 | Performed by: FAMILY MEDICINE

## 2025-07-28 PROCEDURE — 99214 OFFICE O/P EST MOD 30 MIN: CPT | Performed by: FAMILY MEDICINE

## 2025-07-28 RX ORDER — AMOXICILLIN AND CLAVULANATE POTASSIUM 875; 125 MG/1; MG/1
875 TABLET, FILM COATED ORAL 2 TIMES DAILY
Qty: 20 TABLET | Refills: 0 | Status: SHIPPED | OUTPATIENT
Start: 2025-07-28 | End: 2025-07-29 | Stop reason: ALTCHOICE

## 2025-07-28 RX ADMIN — IOHEXOL 75 ML: 350 INJECTION, SOLUTION INTRAVENOUS at 15:58

## 2025-07-28 ASSESSMENT — PATIENT HEALTH QUESTIONNAIRE - PHQ9
1. LITTLE INTEREST OR PLEASURE IN DOING THINGS: NOT AT ALL
SUM OF ALL RESPONSES TO PHQ9 QUESTIONS 1 AND 2: 0
2. FEELING DOWN, DEPRESSED OR HOPELESS: NOT AT ALL

## 2025-07-28 ASSESSMENT — ENCOUNTER SYMPTOMS
HEMATURIA: 0
HEMATOCHEZIA: 0
FATIGUE: 1
BLOOD IN STOOL: 0
HEADACHES: 0
ANOREXIA: 1
CHILLS: 0
BELCHING: 1
DIARRHEA: 1
ABDOMINAL PAIN: 1
PALPITATIONS: 0
FREQUENCY: 0
ARTHRALGIAS: 1
SHORTNESS OF BREATH: 0
ABDOMINAL DISTENTION: 1
VOMITING: 0
NAUSEA: 1
WEIGHT LOSS: 1
COUGH: 0
FEVER: 0
DIZZINESS: 0
DYSURIA: 0
CONSTIPATION: 0
WHEEZING: 0
MYALGIAS: 1
NERVOUS/ANXIOUS: 0
FLATUS: 1
CONFUSION: 0
WEAKNESS: 0

## 2025-07-28 ASSESSMENT — PAIN SCALES - GENERAL: PAINLEVEL_OUTOF10: 0-NO PAIN

## 2025-07-28 NOTE — PROGRESS NOTES
Subjective   Patient ID: Erica Smith is a 80 y.o. female who presents for Abdominal Pain.    Here today with 2 weeks of worsening lower abd, bloating, explosive diarrhea, decreased appetite and increased fatigue, neg fevers or chills, no vomiting. No blood in stools - overdue for a colonoscopy    Abdominal Pain  This is a new problem. The current episode started 1 to 4 weeks ago. The onset quality is gradual. The problem occurs constantly. The problem has been gradually worsening. The pain is located in the LLQ, RLQ and suprapubic region. The pain is at a severity of 5/10. The pain is moderate. The quality of the pain is aching, colicky, sharp and a sensation of fullness. The abdominal pain does not radiate. Associated symptoms include anorexia, arthralgias, belching, diarrhea, flatus, myalgias, nausea and weight loss. Pertinent negatives include no constipation, dysuria, fever, frequency, headaches, hematochezia, hematuria, melena or vomiting. The pain is aggravated by certain positions, eating, palpation and bowel movement. The pain is relieved by Bowel movements, passing flatus and belching. She has tried H2 blockers for the symptoms. The treatment provided no relief. Prior diagnostic workup includes CT scan.        Review of Systems   Constitutional:  Positive for fatigue and weight loss. Negative for chills and fever.   HENT:  Positive for congestion and postnasal drip. Negative for ear pain.    Respiratory:  Negative for cough, shortness of breath and wheezing.    Cardiovascular:  Negative for chest pain, palpitations and leg swelling.   Gastrointestinal:  Positive for abdominal distention, abdominal pain, anorexia, diarrhea, flatus and nausea. Negative for blood in stool, constipation, hematochezia, melena and vomiting.   Genitourinary:  Negative for dysuria, frequency and hematuria.   Musculoskeletal:  Positive for arthralgias and myalgias.   Skin:  Negative for rash.   Neurological:  Negative for  dizziness, weakness and headaches.   Psychiatric/Behavioral:  Negative for confusion. The patient is not nervous/anxious.        Objective   /78   Pulse 83   Ht (!) 1.524 m (5')   Wt 80.3 kg (177 lb)   SpO2 97%   BMI 34.57 kg/m²     Physical Exam  Vitals reviewed.   Constitutional:       General: She is not in acute distress.     Appearance: Normal appearance. She is not ill-appearing.   HENT:      Right Ear: Tympanic membrane normal.      Left Ear: Tympanic membrane normal.      Nose: Congestion and rhinorrhea present.      Mouth/Throat:      Pharynx: No posterior oropharyngeal erythema.     Eyes:      General:         Right eye: No discharge.         Left eye: No discharge.       Cardiovascular:      Rate and Rhythm: Normal rate and regular rhythm.      Heart sounds: Normal heart sounds. No murmur heard.  Pulmonary:      Breath sounds: Normal breath sounds.   Abdominal:      General: Bowel sounds are normal. There is distension.      Palpations: Abdomen is soft. There is no mass.      Tenderness: There is abdominal tenderness. There is no right CVA tenderness or left CVA tenderness.      Hernia: No hernia is present.      Comments: Left lower quadrant abd pain - moderate with slight guarding and rebound of LLQ, mild LLQ abd pain     Musculoskeletal:      Cervical back: Neck supple.      Right lower leg: No edema.      Left lower leg: No edema.     Skin:     Findings: No rash.     Neurological:      General: No focal deficit present.      Mental Status: She is alert and oriented to person, place, and time.     Psychiatric:         Mood and Affect: Mood normal.         Behavior: Behavior normal.         Assessment/Plan   Problem List Items Addressed This Visit           ICD-10-CM    Gastroesophageal reflux disease K21.9    Hypothyroid E03.9    Relevant Orders    TSH with reflex to Free T4 if abnormal    T4, free    Hyperlipidemia E78.5    Relevant Orders    Lipid Panel    Comprehensive Metabolic Panel     Prediabetes R73.03    Relevant Orders    Hemoglobin A1C    Vitamin D deficiency E55.9    Relevant Orders    Vitamin D 25-Hydroxy,Total (for eval of Vitamin D levels)    Stage 3a chronic kidney disease (Multi) N18.31     Other Visit Diagnoses         Codes      Abdominal bloating    -  Primary R14.0    concerns for diverticulitis     Relevant Medications    amoxicillin-clavulanate (Augmentin) 875-125 mg tablet    Other Relevant Orders    CT abdomen pelvis w IV contrast      Weight loss     R63.4    Relevant Orders    CT abdomen pelvis w IV contrast      Left lower quadrant abdominal pain     R10.32    check CT for diverticulitis - start augmentin due to cipro allergy     Relevant Medications    amoxicillin-clavulanate (Augmentin) 875-125 mg tablet    Other Relevant Orders    CT abdomen pelvis w IV contrast    CBC and Auto Differential    Comprehensive Metabolic Panel      Diarrhea, unspecified type     R19.7    Relevant Orders    CT abdomen pelvis w IV contrast      Decreased appetite     R63.0    encourage full liquid diet, advance as tolerated     Relevant Orders    CT abdomen pelvis w IV contrast      Fatigue, unspecified type     R53.83      Vitamin B12 deficiency     E53.8    vit B12 supplement     Relevant Orders    Vitamin B12      Low magnesium level     R79.0    Relevant Orders    Magnesium      Nocturia     R35.1    Relevant Orders    Urinalysis with Reflex Culture and Microscopic

## 2025-07-29 ENCOUNTER — RESULTS FOLLOW-UP (OUTPATIENT)
Dept: PRIMARY CARE | Facility: CLINIC | Age: 80
End: 2025-07-29
Payer: MEDICARE

## 2025-07-29 ENCOUNTER — TELEPHONE (OUTPATIENT)
Dept: PRIMARY CARE | Facility: CLINIC | Age: 80
End: 2025-07-29
Payer: MEDICARE

## 2025-07-29 DIAGNOSIS — K57.92 ACUTE DIVERTICULITIS: ICD-10-CM

## 2025-07-29 DIAGNOSIS — R93.5 ABNORMAL CT OF THE ABDOMEN: Primary | ICD-10-CM

## 2025-07-29 RX ORDER — CEPHALEXIN 500 MG/1
500 CAPSULE ORAL 3 TIMES DAILY
Qty: 30 CAPSULE | Refills: 0 | Status: SHIPPED | OUTPATIENT
Start: 2025-07-29 | End: 2025-08-08

## 2025-07-29 RX ORDER — METRONIDAZOLE 500 MG/1
500 TABLET ORAL 3 TIMES DAILY
Qty: 30 TABLET | Refills: 0 | Status: SHIPPED | OUTPATIENT
Start: 2025-07-29 | End: 2025-08-08

## 2025-07-29 NOTE — TELEPHONE ENCOUNTER
Patient took her first dose of Augmentin last night and then was up every hour last night with diarrhea and vomiting.     She also said that she has been taking Imodium for the past 10 days. Should she continue this?

## 2025-07-29 NOTE — TELEPHONE ENCOUNTER
Stop augmentin, I sent a script for cephalexin and flagyl - I placed an urgent referral to Dr Duarte - GI - I want her seen asap by him (see her CT results)- I encourage a full liquid diet, advance as tolerated. I would stop the Immodium and she can try pepto chewables if needed. If symptoms worsen - to ED

## 2025-08-05 ENCOUNTER — TELEPHONE (OUTPATIENT)
Dept: PRIMARY CARE | Facility: CLINIC | Age: 80
End: 2025-08-05
Payer: MEDICARE

## 2025-08-05 DIAGNOSIS — R11.0 NAUSEA: Primary | ICD-10-CM

## 2025-08-05 RX ORDER — ONDANSETRON 4 MG/1
4 TABLET, FILM COATED ORAL EVERY 8 HOURS PRN
Qty: 30 TABLET | Refills: 0 | Status: SHIPPED | OUTPATIENT
Start: 2025-08-05 | End: 2025-08-15

## 2025-08-13 LAB
25(OH)D3+25(OH)D2 SERPL-MCNC: 47 NG/ML (ref 30–100)
BASOPHILS # BLD AUTO: 65 CELLS/UL (ref 0–200)
BASOPHILS NFR BLD AUTO: 0.6 %
CHOLEST SERPL-MCNC: 170 MG/DL
CHOLEST/HDLC SERPL: 4 (CALC)
EOSINOPHIL # BLD AUTO: 153 CELLS/UL (ref 15–500)
EOSINOPHIL NFR BLD AUTO: 1.4 %
ERYTHROCYTE [DISTWIDTH] IN BLOOD BY AUTOMATED COUNT: 13.1 % (ref 11–15)
EST. AVERAGE GLUCOSE BLD GHB EST-MCNC: 134 MG/DL
EST. AVERAGE GLUCOSE BLD GHB EST-SCNC: 7.4 MMOL/L
HBA1C MFR BLD: 6.3 %
HCT VFR BLD AUTO: 41 % (ref 35–45)
HDLC SERPL-MCNC: 43 MG/DL
HGB BLD-MCNC: 13.7 G/DL (ref 11.7–15.5)
LDLC SERPL CALC-MCNC: 106 MG/DL (CALC)
LYMPHOCYTES # BLD AUTO: 2071 CELLS/UL (ref 850–3900)
LYMPHOCYTES NFR BLD AUTO: 19 %
MAGNESIUM SERPL-MCNC: 2 MG/DL (ref 1.5–2.5)
MCH RBC QN AUTO: 30.7 PG (ref 27–33)
MCHC RBC AUTO-ENTMCNC: 33.4 G/DL (ref 32–36)
MCV RBC AUTO: 91.9 FL (ref 80–100)
MONOCYTES # BLD AUTO: 807 CELLS/UL (ref 200–950)
MONOCYTES NFR BLD AUTO: 7.4 %
NEUTROPHILS # BLD AUTO: 7804 CELLS/UL (ref 1500–7800)
NEUTROPHILS NFR BLD AUTO: 71.6 %
NONHDLC SERPL-MCNC: 127 MG/DL (CALC)
PLATELET # BLD AUTO: 352 THOUSAND/UL (ref 140–400)
PMV BLD REES-ECKER: 9.5 FL (ref 7.5–12.5)
RBC # BLD AUTO: 4.46 MILLION/UL (ref 3.8–5.1)
T4 FREE SERPL-MCNC: 1.1 NG/DL (ref 0.8–1.8)
TRIGL SERPL-MCNC: 116 MG/DL
TSH SERPL-ACNC: 3.56 MIU/L (ref 0.4–4.5)
VIT B12 SERPL-MCNC: 430 PG/ML (ref 200–1100)
WBC # BLD AUTO: 10.9 THOUSAND/UL (ref 3.8–10.8)

## 2025-08-18 ENCOUNTER — TELEPHONE (OUTPATIENT)
Dept: PRIMARY CARE | Facility: CLINIC | Age: 80
End: 2025-08-18
Payer: MEDICARE

## 2025-08-27 LAB
APPEARANCE UR: CLEAR
BACTERIA #/AREA URNS HPF: ABNORMAL /HPF
BACTERIA UR CULT: ABNORMAL
BACTERIA UR CULT: ABNORMAL
BILIRUB UR QL STRIP: NEGATIVE
COLOR UR: YELLOW
GLUCOSE UR QL STRIP: NEGATIVE
HGB UR QL STRIP: NEGATIVE
HYALINE CASTS #/AREA URNS LPF: ABNORMAL /LPF
KETONES UR QL STRIP: NEGATIVE
LEUKOCYTE ESTERASE UR QL STRIP: ABNORMAL
NITRITE UR QL STRIP: NEGATIVE
PH UR STRIP: 6 [PH] (ref 5–8)
PROT UR QL STRIP: NEGATIVE
RBC #/AREA URNS HPF: ABNORMAL /HPF
SERVICE CMNT-IMP: ABNORMAL
SP GR UR STRIP: 1.01 (ref 1–1.03)
SQUAMOUS #/AREA URNS HPF: ABNORMAL /HPF
WBC #/AREA URNS HPF: ABNORMAL /HPF

## 2025-08-28 ENCOUNTER — TELEPHONE (OUTPATIENT)
Dept: AUDIOLOGY | Facility: CLINIC | Age: 80
End: 2025-08-28
Payer: MEDICARE

## 2025-09-06 ENCOUNTER — APPOINTMENT (OUTPATIENT)
Dept: RADIOLOGY | Facility: HOSPITAL | Age: 80
End: 2025-09-06
Payer: MEDICARE